# Patient Record
Sex: MALE | Race: WHITE | NOT HISPANIC OR LATINO | URBAN - METROPOLITAN AREA
[De-identification: names, ages, dates, MRNs, and addresses within clinical notes are randomized per-mention and may not be internally consistent; named-entity substitution may affect disease eponyms.]

---

## 2017-05-26 ENCOUNTER — ANESTHESIA (OUTPATIENT)
Dept: GASTROENTEROLOGY | Facility: HOSPITAL | Age: 52
End: 2017-05-26

## 2017-05-26 ENCOUNTER — HOSPITAL ENCOUNTER (OUTPATIENT)
Facility: HOSPITAL | Age: 52
Setting detail: HOSPITAL OUTPATIENT SURGERY
Discharge: HOME | End: 2017-05-26
Attending: INTERNAL MEDICINE | Admitting: INTERNAL MEDICINE

## 2017-05-26 ENCOUNTER — ANESTHESIA EVENT (OUTPATIENT)
Dept: GASTROENTEROLOGY | Facility: HOSPITAL | Age: 52
End: 2017-05-26

## 2017-05-26 ENCOUNTER — ON CAMPUS - OUTPATIENT (OUTPATIENT)
Dept: URBAN - METROPOLITAN AREA HOSPITAL 114 | Facility: HOSPITAL | Age: 52
End: 2017-05-26
Payer: COMMERCIAL

## 2017-05-26 VITALS
HEART RATE: 79 BPM | WEIGHT: 261.19 LBS | DIASTOLIC BLOOD PRESSURE: 89 MMHG | RESPIRATION RATE: 16 BRPM | TEMPERATURE: 98.6 F | OXYGEN SATURATION: 93 % | SYSTOLIC BLOOD PRESSURE: 127 MMHG | BODY MASS INDEX: 36.56 KG/M2 | HEIGHT: 71 IN

## 2017-05-26 DIAGNOSIS — K62.0 ANAL POLYP: ICD-10-CM

## 2017-05-26 DIAGNOSIS — K63.5 POLYP OF COLON: ICD-10-CM

## 2017-05-26 DIAGNOSIS — Z12.11 ENCOUNTER FOR SCREENING COLONOSCOPY: ICD-10-CM

## 2017-05-26 DIAGNOSIS — Z12.11 ENCOUNTER FOR SCREENING FOR MALIGNANT NEOPLASM OF COLON: ICD-10-CM

## 2017-05-26 PROBLEM — Z72.0 TOBACCO USE: Status: ACTIVE | Noted: 2017-04-06

## 2017-05-26 LAB — GLUCOSE BLDC GLUCOMTR-MCNC: 179 MG/DL (ref 70–130)

## 2017-05-26 PROCEDURE — 88305 TISSUE EXAM BY PATHOLOGIST: CPT | Performed by: INTERNAL MEDICINE

## 2017-05-26 PROCEDURE — 25010000002 PROPOFOL 10 MG/ML EMULSION: Performed by: ANESTHESIOLOGY

## 2017-05-26 PROCEDURE — 25010000002 MIDAZOLAM PER 1 MG: Performed by: ANESTHESIOLOGY

## 2017-05-26 PROCEDURE — 45380 COLONOSCOPY AND BIOPSY: CPT | Mod: 33 | Performed by: INTERNAL MEDICINE

## 2017-05-26 PROCEDURE — 82962 GLUCOSE BLOOD TEST: CPT

## 2017-05-26 RX ORDER — PROPOFOL 10 MG/ML
VIAL (ML) INTRAVENOUS CONTINUOUS PRN
Status: DISCONTINUED | OUTPATIENT
Start: 2017-05-26 | End: 2017-05-26 | Stop reason: SURG

## 2017-05-26 RX ORDER — LISINOPRIL AND HYDROCHLOROTHIAZIDE 20; 12.5 MG/1; MG/1
1 TABLET ORAL DAILY
COMMUNITY

## 2017-05-26 RX ORDER — MIDAZOLAM HYDROCHLORIDE 1 MG/ML
INJECTION INTRAMUSCULAR; INTRAVENOUS AS NEEDED
Status: DISCONTINUED | OUTPATIENT
Start: 2017-05-26 | End: 2017-05-26 | Stop reason: SURG

## 2017-05-26 RX ORDER — SODIUM CHLORIDE, SODIUM LACTATE, POTASSIUM CHLORIDE, CALCIUM CHLORIDE 600; 310; 30; 20 MG/100ML; MG/100ML; MG/100ML; MG/100ML
1000 INJECTION, SOLUTION INTRAVENOUS CONTINUOUS PRN
Status: DISCONTINUED | OUTPATIENT
Start: 2017-05-26 | End: 2017-05-26 | Stop reason: HOSPADM

## 2017-05-26 RX ORDER — PROPOFOL 10 MG/ML
VIAL (ML) INTRAVENOUS AS NEEDED
Status: DISCONTINUED | OUTPATIENT
Start: 2017-05-26 | End: 2017-05-26 | Stop reason: SURG

## 2017-05-26 RX ORDER — ASPIRIN 81 MG/1
81 TABLET ORAL DAILY
COMMUNITY

## 2017-05-26 RX ORDER — SIMVASTATIN 40 MG
40 TABLET ORAL NIGHTLY
COMMUNITY

## 2017-05-26 RX ADMIN — PROPOFOL 100 MCG/KG/MIN: 10 INJECTION, EMULSION INTRAVENOUS at 08:50

## 2017-05-26 RX ADMIN — SODIUM CHLORIDE, POTASSIUM CHLORIDE, SODIUM LACTATE AND CALCIUM CHLORIDE 1000 ML: 600; 310; 30; 20 INJECTION, SOLUTION INTRAVENOUS at 08:08

## 2017-05-26 RX ADMIN — MIDAZOLAM HYDROCHLORIDE 1 MG: 1 INJECTION, SOLUTION INTRAMUSCULAR; INTRAVENOUS at 08:47

## 2017-05-26 RX ADMIN — ALFENTANIL HYDROCHLORIDE 250 MCG: 500 INJECTION, SOLUTION INTRAVENOUS at 08:47

## 2017-05-26 RX ADMIN — PROPOFOL 100 MG: 10 INJECTION, EMULSION INTRAVENOUS at 08:50

## 2017-05-30 LAB
CYTO UR: NORMAL
LAB AP CASE REPORT: NORMAL
Lab: NORMAL
PATH REPORT.FINAL DX SPEC: NORMAL
PATH REPORT.GROSS SPEC: NORMAL

## 2017-08-29 ENCOUNTER — TRANSCRIBE ORDERS (OUTPATIENT)
Dept: ADMINISTRATIVE | Facility: HOSPITAL | Age: 52
End: 2017-08-29

## 2017-08-29 DIAGNOSIS — E34.9 INCREASED PTH LEVEL: Primary | ICD-10-CM

## 2017-08-29 DIAGNOSIS — Z79.4 CURRENT USE OF INSULIN (HCC): ICD-10-CM

## 2017-09-06 ENCOUNTER — HOSPITAL ENCOUNTER (OUTPATIENT)
Dept: NUCLEAR MEDICINE | Facility: HOSPITAL | Age: 52
Discharge: HOME | End: 2017-09-06
Attending: SPECIALIST

## 2017-09-06 ENCOUNTER — HOSPITAL ENCOUNTER (OUTPATIENT)
Dept: ULTRASOUND IMAGING | Facility: HOSPITAL | Age: 52
Discharge: HOME | End: 2017-09-06
Attending: SPECIALIST | Admitting: SPECIALIST

## 2017-09-06 DIAGNOSIS — E34.9 INCREASED PTH LEVEL: ICD-10-CM

## 2017-09-06 DIAGNOSIS — Z79.4 CURRENT USE OF INSULIN (HCC): ICD-10-CM

## 2017-09-06 PROCEDURE — 0 TECHNETIUM SESTAMIBI: Performed by: SPECIALIST

## 2017-09-06 PROCEDURE — A9500 TC99M SESTAMIBI: HCPCS | Performed by: SPECIALIST

## 2017-09-06 PROCEDURE — 76536 US EXAM OF HEAD AND NECK: CPT

## 2017-09-06 PROCEDURE — 78071 PARATHYRD PLANAR W/WO SUBTRJ: CPT

## 2017-09-06 RX ADMIN — TECHNETIUM TC-99M SESTAMIBI 1 DOSE: 1 INJECTION INTRAVENOUS at 09:15

## 2017-09-11 ENCOUNTER — APPOINTMENT (OUTPATIENT)
Dept: CT IMAGING | Facility: HOSPITAL | Age: 52
End: 2017-09-11

## 2017-09-11 ENCOUNTER — HOSPITAL ENCOUNTER (EMERGENCY)
Facility: HOSPITAL | Age: 52
Discharge: HOME | End: 2017-09-11
Attending: EMERGENCY MEDICINE | Admitting: EMERGENCY MEDICINE

## 2017-09-11 VITALS
OXYGEN SATURATION: 94 % | DIASTOLIC BLOOD PRESSURE: 80 MMHG | SYSTOLIC BLOOD PRESSURE: 135 MMHG | RESPIRATION RATE: 18 BRPM | BODY MASS INDEX: 37.1 KG/M2 | TEMPERATURE: 98.9 F | HEIGHT: 71 IN | WEIGHT: 265 LBS | HEART RATE: 84 BPM

## 2017-09-11 DIAGNOSIS — N23 RENAL COLIC ON LEFT SIDE: ICD-10-CM

## 2017-09-11 DIAGNOSIS — N20.1 LEFT URETERAL STONE: Primary | ICD-10-CM

## 2017-09-11 LAB
ALBUMIN SERPL-MCNC: 4.9 G/DL (ref 3.5–5.2)
ALBUMIN/GLOB SERPL: 1.6 G/DL
ALP SERPL-CCNC: 70 U/L (ref 39–117)
ALT SERPL W P-5'-P-CCNC: 32 U/L (ref 1–41)
ANION GAP SERPL CALCULATED.3IONS-SCNC: 15.2 MMOL/L
AST SERPL-CCNC: 25 U/L (ref 1–40)
BACTERIA UR QL AUTO: ABNORMAL /HPF
BASOPHILS # BLD AUTO: 0.01 10*3/MM3 (ref 0–0.2)
BASOPHILS NFR BLD AUTO: 0.1 % (ref 0–1.5)
BILIRUB SERPL-MCNC: 0.5 MG/DL (ref 0.1–1.2)
BILIRUB UR QL STRIP: NEGATIVE
BUN BLD-MCNC: 13 MG/DL (ref 6–20)
BUN/CREAT SERPL: 11.6 (ref 7–25)
CALCIUM SPEC-SCNC: 10 MG/DL (ref 8.6–10.5)
CHLORIDE SERPL-SCNC: 98 MMOL/L (ref 98–107)
CLARITY UR: ABNORMAL
CO2 SERPL-SCNC: 25.8 MMOL/L (ref 22–29)
COLOR UR: ABNORMAL
CREAT BLD-MCNC: 1.12 MG/DL (ref 0.76–1.27)
DEPRECATED RDW RBC AUTO: 38.5 FL (ref 37–54)
EOSINOPHIL # BLD AUTO: 0.19 10*3/MM3 (ref 0–0.7)
EOSINOPHIL NFR BLD AUTO: 2 % (ref 0.3–6.2)
ERYTHROCYTE [DISTWIDTH] IN BLOOD BY AUTOMATED COUNT: 12 % (ref 11.5–14.5)
GFR SERPL CREATININE-BSD FRML MDRD: 69 ML/MIN/1.73
GLOBULIN UR ELPH-MCNC: 3.1 GM/DL
GLUCOSE BLD-MCNC: 161 MG/DL (ref 65–99)
GLUCOSE UR STRIP-MCNC: ABNORMAL MG/DL
HCT VFR BLD AUTO: 47.2 % (ref 40.4–52.2)
HGB BLD-MCNC: 16.9 G/DL (ref 13.7–17.6)
HGB UR QL STRIP.AUTO: ABNORMAL
HOLD SPECIMEN: NORMAL
HOLD SPECIMEN: NORMAL
HYALINE CASTS UR QL AUTO: ABNORMAL /LPF
IMM GRANULOCYTES # BLD: 0.04 10*3/MM3 (ref 0–0.03)
IMM GRANULOCYTES NFR BLD: 0.4 % (ref 0–0.5)
KETONES UR QL STRIP: ABNORMAL
LEUKOCYTE ESTERASE UR QL STRIP.AUTO: ABNORMAL
LIPASE SERPL-CCNC: 45 U/L (ref 13–60)
LYMPHOCYTES # BLD AUTO: 2.2 10*3/MM3 (ref 0.9–4.8)
LYMPHOCYTES NFR BLD AUTO: 23.1 % (ref 19.6–45.3)
MCH RBC QN AUTO: 31.9 PG (ref 27–32.7)
MCHC RBC AUTO-ENTMCNC: 35.8 G/DL (ref 32.6–36.4)
MCV RBC AUTO: 89.2 FL (ref 79.8–96.2)
MONOCYTES # BLD AUTO: 0.72 10*3/MM3 (ref 0.2–1.2)
MONOCYTES NFR BLD AUTO: 7.6 % (ref 5–12)
NEUTROPHILS # BLD AUTO: 6.35 10*3/MM3 (ref 1.9–8.1)
NEUTROPHILS NFR BLD AUTO: 66.8 % (ref 42.7–76)
NITRITE UR QL STRIP: NEGATIVE
PH UR STRIP.AUTO: 5.5 [PH] (ref 5–8)
PLATELET # BLD AUTO: 255 10*3/MM3 (ref 140–500)
PMV BLD AUTO: 10.1 FL (ref 6–12)
POTASSIUM BLD-SCNC: 4 MMOL/L (ref 3.5–5.2)
PROT SERPL-MCNC: 8 G/DL (ref 6–8.5)
PROT UR QL STRIP: ABNORMAL
RBC # BLD AUTO: 5.29 10*6/MM3 (ref 4.6–6)
RBC # UR: ABNORMAL /HPF
REF LAB TEST METHOD: ABNORMAL
SODIUM BLD-SCNC: 139 MMOL/L (ref 136–145)
SP GR UR STRIP: 1.02 (ref 1–1.03)
SQUAMOUS #/AREA URNS HPF: ABNORMAL /HPF
UROBILINOGEN UR QL STRIP: ABNORMAL
WBC NRBC COR # BLD: 9.51 10*3/MM3 (ref 4.5–10.7)
WBC UR QL AUTO: ABNORMAL /HPF
WHOLE BLOOD HOLD SPECIMEN: NORMAL
WHOLE BLOOD HOLD SPECIMEN: NORMAL

## 2017-09-11 PROCEDURE — 25010000002 KETOROLAC TROMETHAMINE PER 15 MG: Performed by: EMERGENCY MEDICINE

## 2017-09-11 PROCEDURE — 80053 COMPREHEN METABOLIC PANEL: CPT | Performed by: EMERGENCY MEDICINE

## 2017-09-11 PROCEDURE — 74176 CT ABD & PELVIS W/O CONTRAST: CPT

## 2017-09-11 PROCEDURE — 96361 HYDRATE IV INFUSION ADD-ON: CPT

## 2017-09-11 PROCEDURE — 36415 COLL VENOUS BLD VENIPUNCTURE: CPT | Performed by: EMERGENCY MEDICINE

## 2017-09-11 PROCEDURE — 81001 URINALYSIS AUTO W/SCOPE: CPT | Performed by: EMERGENCY MEDICINE

## 2017-09-11 PROCEDURE — 87086 URINE CULTURE/COLONY COUNT: CPT | Performed by: EMERGENCY MEDICINE

## 2017-09-11 PROCEDURE — 25010000002 HYDROMORPHONE PER 4 MG: Performed by: EMERGENCY MEDICINE

## 2017-09-11 PROCEDURE — 96375 TX/PRO/DX INJ NEW DRUG ADDON: CPT

## 2017-09-11 PROCEDURE — 85025 COMPLETE CBC W/AUTO DIFF WBC: CPT | Performed by: EMERGENCY MEDICINE

## 2017-09-11 PROCEDURE — 99284 EMERGENCY DEPT VISIT MOD MDM: CPT

## 2017-09-11 PROCEDURE — 96374 THER/PROPH/DIAG INJ IV PUSH: CPT

## 2017-09-11 PROCEDURE — 96376 TX/PRO/DX INJ SAME DRUG ADON: CPT

## 2017-09-11 PROCEDURE — 83690 ASSAY OF LIPASE: CPT | Performed by: EMERGENCY MEDICINE

## 2017-09-11 PROCEDURE — 25010000002 ONDANSETRON PER 1 MG: Performed by: EMERGENCY MEDICINE

## 2017-09-11 RX ORDER — KETOROLAC TROMETHAMINE 30 MG/ML
15 INJECTION, SOLUTION INTRAMUSCULAR; INTRAVENOUS ONCE
Status: COMPLETED | OUTPATIENT
Start: 2017-09-11 | End: 2017-09-11

## 2017-09-11 RX ORDER — SODIUM CHLORIDE 0.9 % (FLUSH) 0.9 %
10 SYRINGE (ML) INJECTION AS NEEDED
Status: DISCONTINUED | OUTPATIENT
Start: 2017-09-11 | End: 2017-09-11 | Stop reason: HOSPADM

## 2017-09-11 RX ORDER — ONDANSETRON 2 MG/ML
4 INJECTION INTRAMUSCULAR; INTRAVENOUS ONCE
Status: COMPLETED | OUTPATIENT
Start: 2017-09-11 | End: 2017-09-11

## 2017-09-11 RX ORDER — OXYCODONE AND ACETAMINOPHEN 10; 325 MG/1; MG/1
1 TABLET ORAL EVERY 4 HOURS PRN
Qty: 18 TABLET | Refills: 0 | Status: SHIPPED | OUTPATIENT
Start: 2017-09-11 | End: 2018-05-04

## 2017-09-11 RX ORDER — TAMSULOSIN HYDROCHLORIDE 0.4 MG/1
1 CAPSULE ORAL NIGHTLY
Qty: 10 CAPSULE | Refills: 0 | Status: SHIPPED | OUTPATIENT
Start: 2017-09-11 | End: 2018-05-04

## 2017-09-11 RX ORDER — ONDANSETRON 4 MG/1
4 TABLET, ORALLY DISINTEGRATING ORAL EVERY 6 HOURS PRN
Qty: 15 TABLET | Refills: 0 | Status: SHIPPED | OUTPATIENT
Start: 2017-09-11 | End: 2018-05-04

## 2017-09-11 RX ORDER — SULFAMETHOXAZOLE AND TRIMETHOPRIM 800; 160 MG/1; MG/1
1 TABLET ORAL 2 TIMES DAILY
Qty: 20 TABLET | Refills: 0 | Status: SHIPPED | OUTPATIENT
Start: 2017-09-11 | End: 2018-05-04

## 2017-09-11 RX ADMIN — KETOROLAC TROMETHAMINE 15 MG: 30 INJECTION, SOLUTION INTRAMUSCULAR at 17:48

## 2017-09-11 RX ADMIN — HYDROMORPHONE HYDROCHLORIDE 1 MG: 1 INJECTION, SOLUTION INTRAMUSCULAR; INTRAVENOUS; SUBCUTANEOUS at 18:24

## 2017-09-11 RX ADMIN — KETOROLAC TROMETHAMINE 15 MG: 30 INJECTION, SOLUTION INTRAMUSCULAR at 20:08

## 2017-09-11 RX ADMIN — SODIUM CHLORIDE 1000 ML: 9 INJECTION, SOLUTION INTRAVENOUS at 17:52

## 2017-09-11 RX ADMIN — HYDROMORPHONE HYDROCHLORIDE 1 MG: 1 INJECTION, SOLUTION INTRAMUSCULAR; INTRAVENOUS; SUBCUTANEOUS at 17:48

## 2017-09-11 RX ADMIN — ONDANSETRON 4 MG: 2 INJECTION INTRAMUSCULAR; INTRAVENOUS at 17:47

## 2017-09-11 NOTE — ED PROVIDER NOTES
" EMERGENCY DEPARTMENT ENCOUNTER    CHIEF COMPLAINT  Chief Complaint: abdominal pain  History given by: pt  History limited by: nothing  Room Number: 19/19  PMD: No Known Provider      HPI:  Pt is a 52 y.o. male who presents complaining of episodic abdominal pain for one day. Last episode of pain began one and a half hours ago, and has not resolved. Pt has had 8 past kidney stones, some of which have passed on their own.  His last renal stone was approximately ten years ago and required lithotripsy and ureteral stenting.    Duration:  One day  Onset: gradual  Timing: episodic  Location: abdomen  Radiation: none  Quality: \"pain\"  Intensity/Severity: moderate  Progression: unchanged  Associated Symptoms: none  Aggravating Factors: unknown  Alleviating Factors: unknown  Previous Episodes: Pt has had 8 past kidney stones  Treatment before arrival: unknown    PAST MEDICAL HISTORY  Active Ambulatory Problems     Diagnosis Date Noted   • Controlled type 2 diabetes mellitus without complication 11/06/2012   • Hypertension 11/06/2012   • Hyperlipidemia 11/06/2012   • Obstructive sleep apnea syndrome 08/14/2014   • Tobacco use 04/06/2017     Resolved Ambulatory Problems     Diagnosis Date Noted   • No Resolved Ambulatory Problems     Past Medical History:   Diagnosis Date   • Depression    • Diabetes mellitus    • Hyperlipidemia    • Hypertension    • Kidney stones    • Sleep apnea        PAST SURGICAL HISTORY  Past Surgical History:   Procedure Laterality Date   • BRANCHIAL CLEFT CYST EXCISION     • COLONOSCOPY N/A 5/26/2017    Procedure: COLONOSCOPY INTO TERMINAL ILEUM WITH COLD BIOPSY POLYPECTOMIES;  Surgeon: Kelby Winter MD;  Location: [facility];  Service:    • CYSTOSCOPY     • EYE SURGERY     • TONSILLECTOMY         FAMILY HISTORY  History reviewed. No pertinent family history.    SOCIAL HISTORY  Social History     Social History   • Marital status:      Spouse name: N/A   • Number of children: N/A   • " Years of education: N/A     Occupational History   • Not on file.     Social History Main Topics   • Smoking status: Current Every Day Smoker     Packs/day: 0.25   • Smokeless tobacco: Not on file   • Alcohol use No   • Drug use: No   • Sexual activity: Defer     Other Topics Concern   • Not on file     Social History Narrative       ALLERGIES  Penicillins and Sertraline hcl    REVIEW OF SYSTEMS  Review of Systems   Constitutional: Negative for activity change, appetite change and fever.   HENT: Negative for congestion and sore throat.    Eyes: Negative.    Respiratory: Negative for cough and shortness of breath.    Cardiovascular: Negative for chest pain and leg swelling.   Gastrointestinal: Positive for abdominal pain. Negative for diarrhea and vomiting.   Endocrine: Negative.    Genitourinary: Negative for decreased urine volume and dysuria.   Musculoskeletal: Negative for neck pain.   Skin: Negative for rash and wound.   Allergic/Immunologic: Negative.    Neurological: Negative for weakness, numbness and headaches.   Hematological: Negative.    Psychiatric/Behavioral: Negative.    All other systems reviewed and are negative.      PHYSICAL EXAM  ED Triage Vitals   Temp Heart Rate Resp BP SpO2   09/11/17 1650 09/11/17 1650 09/11/17 1650 09/11/17 1650 09/11/17 1650   98.9 °F (37.2 °C) 87 16 168/100 98 %      Temp src Heart Rate Source Patient Position BP Location FiO2 (%)   09/11/17 1650 -- -- -- --   Oral           Physical Exam   Constitutional: He is oriented to person, place, and time and well-developed, well-nourished, and in no distress. He appears distressed (moderate, pacing in the room).   HENT:   Head: Normocephalic and atraumatic.   Eyes: EOM are normal. Pupils are equal, round, and reactive to light.   Neck: Normal range of motion. Neck supple.   Cardiovascular: Regular rhythm and normal heart sounds.  Tachycardia present.    Pulmonary/Chest: Effort normal and breath sounds normal. No respiratory  distress.   Abdominal: Soft. There is tenderness in the left lower quadrant. There is CVA tenderness (left). There is no rebound and no guarding.   Musculoskeletal: Normal range of motion. He exhibits no edema.   Neurological: He is alert and oriented to person, place, and time. He has normal sensation and normal strength.   Skin: Skin is warm and dry.   Psychiatric: Mood and affect normal.   Nursing note and vitals reviewed.      LAB RESULTS  Lab Results (last 24 hours)     Procedure Component Value Units Date/Time    CBC & Differential [[id number]] Collected:  09/11/17 1657    Specimen:  Blood Updated:  09/11/17 1711    Narrative:       The following orders were created for panel order CBC & Differential.  Procedure                               Abnormality         Status                     ---------                               -----------         ------                     CBC Auto Differential[[id number]]        Abnormal            Final result                 Please view results for these tests on the individual orders.    Comprehensive Metabolic Panel [[id number]]  (Abnormal) Collected:  09/11/17 1657    Specimen:  Blood Updated:  09/11/17 1731     Glucose 161 (H) mg/dL      BUN 13 mg/dL      Creatinine 1.12 mg/dL      Sodium 139 mmol/L      Potassium 4.0 mmol/L      Chloride 98 mmol/L      CO2 25.8 mmol/L      Calcium 10.0 mg/dL      Total Protein 8.0 g/dL      Albumin 4.90 g/dL      ALT (SGPT) 32 U/L      AST (SGOT) 25 U/L      Alkaline Phosphatase 70 U/L      Total Bilirubin 0.5 mg/dL      eGFR Non African Amer 69 mL/min/1.73      Globulin 3.1 gm/dL      A/G Ratio 1.6 g/dL      BUN/Creatinine Ratio 11.6     Anion Gap 15.2 mmol/L     Lipase [[id number]]  (Normal) Collected:  09/11/17 1657    Specimen:  Blood Updated:  09/11/17 1731     Lipase 45 U/L     CBC Auto Differential [[id number]]  (Abnormal) Collected:  09/11/17 1657    Specimen:  Blood Updated:  09/11/17 1711     WBC 9.51 10*3/mm3      RBC 5.29  10*6/mm3      Hemoglobin 16.9 g/dL      Hematocrit 47.2 %      MCV 89.2 fL      MCH 31.9 pg      MCHC 35.8 g/dL      RDW 12.0 %      RDW-SD 38.5 fl      MPV 10.1 fL      Platelets 255 10*3/mm3      Neutrophil % 66.8 %      Lymphocyte % 23.1 %      Monocyte % 7.6 %      Eosinophil % 2.0 %      Basophil % 0.1 %      Immature Grans % 0.4 %      Neutrophils, Absolute 6.35 10*3/mm3      Lymphocytes, Absolute 2.20 10*3/mm3      Monocytes, Absolute 0.72 10*3/mm3      Eosinophils, Absolute 0.19 10*3/mm3      Basophils, Absolute 0.01 10*3/mm3      Immature Grans, Absolute 0.04 (H) 10*3/mm3     Urinalysis With / Culture If Indicated [[id number]]  (Abnormal) Collected:  09/11/17 1714    Specimen:  Urine from Urine, Clean Catch Updated:  09/11/17 1746     Color, UA Anette (A)     Appearance, UA Cloudy (A)     pH, UA 5.5     Specific Gravity, UA 1.024     Glucose,  mg/dL (Trace) (A)     Ketones, UA Trace (A)     Bilirubin, UA Negative     Blood, UA Large (3+) (A)     Protein,  mg/dL (2+) (A)     Leuk Esterase, UA Small (1+) (A)     Nitrite, UA Negative     Urobilinogen, UA 1.0 E.U./dL    Urinalysis, Microscopic Only [[id number]]  (Abnormal) Collected:  09/11/17 1714    Specimen:  Urine from Urine, Clean Catch Updated:  09/11/17 1757     RBC, UA Too Numerous to Count (A) /HPF      WBC, UA 6-12 (A) /HPF      Bacteria, UA 3+ (A) /HPF      Squamous Epithelial Cells, UA None Seen /HPF      Hyaline Casts, UA None Seen /LPF      Methodology Manual Light Microscopy    Urine Culture [[id number]] Collected:  09/11/17 1714    Specimen:  Urine from Urine, Clean Catch Updated:  09/11/17 1757          I ordered the above labs and reviewed the results    RADIOLOGY  CT Abdomen Pelvis Without Contrast   Final Result   1. 5 mm distal left ureteral calcification with mild hydronephrosis and   hydroureter.       This report was finalized on 9/11/2017 7:02 PM by Dr. Spenser Woodson MD.               I ordered the above noted radiological  studies. Interpreted by radiologist. Reviewed by me in PACS.       PROCEDURES  Procedures      PROGRESS AND CONSULTS  ED Course     1652 - Ordered labs for further evaluation.     1735 - Ordered CT Abd/Pelvis for further evaluation. Ordered IVF for hydration. Ordered Toradol, Dilaudid, and Zofran for pain management.    1821 - Ordered additional Dilaudid for pain management.    1943 - Pt rechecked. Pt's pain is improved. Notified pt of imaging which did show a stone in the left ureter. Discussed plan to discharge the pt on pain and nausea medication, Flomax, and an antibiotic. Pt should follow up with a urologist. Pt understands and agrees with plan, all questions addressed.    MEDICAL DECISION MAKING  Results were reviewed/discussed with the patient and they were also made aware of online access. Pt also made aware that some labs, such as cultures, will not be resulted during ER visit and follow up with PMD is necessary.     MDM  Number of Diagnoses or Management Options     Amount and/or Complexity of Data Reviewed  Clinical lab tests: reviewed and ordered (RBC, UA - too numerous. WBC, UA - 6-12. Bacteria, UA - 3+)  Tests in the radiology section of CPT®: reviewed and ordered (CT Abd/Pelvis - 1. 5 mm distal left ureteral calcification with mild hydronephrosis and hydroureter.)           DIAGNOSIS  Final diagnoses:   Left ureteral stone   Renal colic on left side       DISPOSITION  DISCHARGE    Patient discharged in stable condition.    Reviewed implications of results, diagnosis, meds, responsibility to follow up, warning signs and symptoms of possible worsening, potential complications and reasons to return to ER.    Patient/Family voiced understanding of above instructions.    Discussed plan for discharge, as there is no emergent indication for admission.  Pt/family is agreeable and understands need for follow up and repeat testing.  Pt is aware that discharge does not mean that nothing is wrong but it indicates  no emergency is present that requires admission and they must continue care with follow-up as given below or physician of their choice.     FOLLOW-UP  Grady Brock Jr., MD  [address] [id number]  [phone]    Schedule an appointment as soon as possible for a visit           Medication List      New Prescriptions          ondansetron ODT 4 MG disintegrating tablet   Commonly known as:  ZOFRAN-ODT   Take 1 tablet by mouth Every 6 (Six) Hours As Needed for Nausea or   Vomiting.       oxyCODONE-acetaminophen  MG per tablet   Commonly known as:  PERCOCET   Take 1 tablet by mouth Every 4 (Four) Hours As Needed for Moderate Pain .       sulfamethoxazole-trimethoprim 800-160 MG per tablet   Commonly known as:  BACTRIM DS,SEPTRA DS   Take 1 tablet by mouth 2 (Two) Times a Day.       tamsulosin 0.4 MG capsule 24 hr capsule   Commonly known as:  FLOMAX   Take 1 capsule by mouth Every Night.               Latest Documented Vital Signs:  As of 11:03 PM  BP- 135/80 HR- 84 Temp- 98.9 °F (37.2 °C) (Oral) O2 sat- 94%    --  Documentation assistance provided by joyce So for Dr. Lyman.  Information recorded by the scribe was done at my direction and has been verified and validated by me.     Greg So  09/11/17 1954       Elton Lyman MD  09/11/17 4997

## 2017-09-13 LAB — BACTERIA SPEC AEROBE CULT: NORMAL

## 2017-09-15 ENCOUNTER — ANESTHESIA EVENT (OUTPATIENT)
Dept: PERIOP | Facility: HOSPITAL | Age: 52
End: 2017-09-15

## 2017-09-15 ENCOUNTER — ANESTHESIA (OUTPATIENT)
Dept: PERIOP | Facility: HOSPITAL | Age: 52
End: 2017-09-15

## 2017-09-15 ENCOUNTER — HOSPITAL ENCOUNTER (OUTPATIENT)
Facility: HOSPITAL | Age: 52
Setting detail: HOSPITAL OUTPATIENT SURGERY
Discharge: HOME | End: 2017-09-15
Attending: UROLOGY | Admitting: UROLOGY

## 2017-09-15 VITALS
SYSTOLIC BLOOD PRESSURE: 136 MMHG | BODY MASS INDEX: 37.32 KG/M2 | OXYGEN SATURATION: 92 % | TEMPERATURE: 97.9 F | WEIGHT: 267.6 LBS | HEART RATE: 84 BPM | DIASTOLIC BLOOD PRESSURE: 82 MMHG | RESPIRATION RATE: 16 BRPM

## 2017-09-15 DIAGNOSIS — N20.1 URETERAL CALCULUS, LEFT: Primary | ICD-10-CM

## 2017-09-15 LAB — GLUCOSE BLDC GLUCOMTR-MCNC: 239 MG/DL (ref 70–130)

## 2017-09-15 PROCEDURE — 25010000002 FENTANYL CITRATE (PF) 100 MCG/2ML SOLUTION: Performed by: ANESTHESIOLOGY

## 2017-09-15 PROCEDURE — 25010000002 FENTANYL CITRATE (PF) 100 MCG/2ML SOLUTION: Performed by: NURSE ANESTHETIST, CERTIFIED REGISTERED

## 2017-09-15 PROCEDURE — 25010000002 LEVOFLOXACIN PER 250 MG: Performed by: UROLOGY

## 2017-09-15 PROCEDURE — C2617 STENT, NON-COR, TEM W/O DEL: HCPCS | Performed by: UROLOGY

## 2017-09-15 PROCEDURE — 25010000002 ONDANSETRON PER 1 MG: Performed by: ANESTHESIOLOGY

## 2017-09-15 PROCEDURE — 25010000002 MIDAZOLAM PER 1 MG: Performed by: ANESTHESIOLOGY

## 2017-09-15 PROCEDURE — 25010000002 ONDANSETRON PER 1 MG: Performed by: NURSE ANESTHETIST, CERTIFIED REGISTERED

## 2017-09-15 PROCEDURE — C1758 CATHETER, URETERAL: HCPCS | Performed by: UROLOGY

## 2017-09-15 PROCEDURE — 82962 GLUCOSE BLOOD TEST: CPT

## 2017-09-15 PROCEDURE — 25010000002 PROPOFOL 10 MG/ML EMULSION: Performed by: NURSE ANESTHETIST, CERTIFIED REGISTERED

## 2017-09-15 PROCEDURE — 63710000001 PROMETHAZINE PER 25 MG

## 2017-09-15 PROCEDURE — 63710000001 PROMETHAZINE PER 25 MG: Performed by: ANESTHESIOLOGY

## 2017-09-15 DEVICE — URETERAL STENT
Type: IMPLANTABLE DEVICE | Site: URETER | Status: FUNCTIONAL
Brand: CONTOUR™

## 2017-09-15 RX ORDER — SODIUM CHLORIDE, SODIUM LACTATE, POTASSIUM CHLORIDE, CALCIUM CHLORIDE 600; 310; 30; 20 MG/100ML; MG/100ML; MG/100ML; MG/100ML
9 INJECTION, SOLUTION INTRAVENOUS CONTINUOUS
Status: DISCONTINUED | OUTPATIENT
Start: 2017-09-15 | End: 2017-09-18 | Stop reason: HOSPADM

## 2017-09-15 RX ORDER — FAMOTIDINE 10 MG/ML
20 INJECTION, SOLUTION INTRAVENOUS ONCE
Status: COMPLETED | OUTPATIENT
Start: 2017-09-15 | End: 2017-09-15

## 2017-09-15 RX ORDER — PROMETHAZINE HYDROCHLORIDE 25 MG/1
TABLET ORAL
Status: COMPLETED
Start: 2017-09-15 | End: 2017-09-15

## 2017-09-15 RX ORDER — CIPROFLOXACIN 500 MG/1
500 TABLET, FILM COATED ORAL 2 TIMES DAILY
Qty: 10 TABLET | Refills: 0 | Status: SHIPPED | OUTPATIENT
Start: 2017-09-15 | End: 2017-09-20

## 2017-09-15 RX ORDER — PROPOFOL 10 MG/ML
VIAL (ML) INTRAVENOUS AS NEEDED
Status: DISCONTINUED | OUTPATIENT
Start: 2017-09-15 | End: 2017-09-15 | Stop reason: SURG

## 2017-09-15 RX ORDER — LIDOCAINE HYDROCHLORIDE 10 MG/ML
0.5 INJECTION, SOLUTION EPIDURAL; INFILTRATION; INTRACAUDAL; PERINEURAL ONCE AS NEEDED
Status: COMPLETED | OUTPATIENT
Start: 2017-09-15 | End: 2017-09-15

## 2017-09-15 RX ORDER — FLUMAZENIL 0.1 MG/ML
0.2 INJECTION INTRAVENOUS AS NEEDED
Status: DISCONTINUED | OUTPATIENT
Start: 2017-09-15 | End: 2017-09-18 | Stop reason: HOSPADM

## 2017-09-15 RX ORDER — MIDAZOLAM HYDROCHLORIDE 1 MG/ML
2 INJECTION INTRAMUSCULAR; INTRAVENOUS
Status: DISCONTINUED | OUTPATIENT
Start: 2017-09-15 | End: 2017-09-15 | Stop reason: HOSPADM

## 2017-09-15 RX ORDER — ONDANSETRON 2 MG/ML
4 INJECTION INTRAMUSCULAR; INTRAVENOUS ONCE AS NEEDED
Status: COMPLETED | OUTPATIENT
Start: 2017-09-15 | End: 2017-09-15

## 2017-09-15 RX ORDER — ONDANSETRON 2 MG/ML
INJECTION INTRAMUSCULAR; INTRAVENOUS AS NEEDED
Status: DISCONTINUED | OUTPATIENT
Start: 2017-09-15 | End: 2017-09-15 | Stop reason: SURG

## 2017-09-15 RX ORDER — MAGNESIUM HYDROXIDE 1200 MG/15ML
LIQUID ORAL AS NEEDED
Status: DISCONTINUED | OUTPATIENT
Start: 2017-09-15 | End: 2017-09-15 | Stop reason: HOSPADM

## 2017-09-15 RX ORDER — FENTANYL CITRATE 50 UG/ML
50 INJECTION, SOLUTION INTRAMUSCULAR; INTRAVENOUS
Status: DISCONTINUED | OUTPATIENT
Start: 2017-09-15 | End: 2017-09-18 | Stop reason: HOSPADM

## 2017-09-15 RX ORDER — HYDRALAZINE HYDROCHLORIDE 20 MG/ML
5 INJECTION INTRAMUSCULAR; INTRAVENOUS
Status: DISCONTINUED | OUTPATIENT
Start: 2017-09-15 | End: 2017-09-18 | Stop reason: HOSPADM

## 2017-09-15 RX ORDER — FENTANYL CITRATE 50 UG/ML
INJECTION, SOLUTION INTRAMUSCULAR; INTRAVENOUS AS NEEDED
Status: DISCONTINUED | OUTPATIENT
Start: 2017-09-15 | End: 2017-09-15 | Stop reason: SURG

## 2017-09-15 RX ORDER — DEXTROSE, SODIUM CHLORIDE, SODIUM LACTATE, POTASSIUM CHLORIDE, AND CALCIUM CHLORIDE 5; .6; .31; .03; .02 G/100ML; G/100ML; G/100ML; G/100ML; G/100ML
125 INJECTION, SOLUTION INTRAVENOUS ONCE
Status: COMPLETED | OUTPATIENT
Start: 2017-09-15 | End: 2017-09-15

## 2017-09-15 RX ORDER — LEVOFLOXACIN 5 MG/ML
750 INJECTION, SOLUTION INTRAVENOUS EVERY 24 HOURS
Status: DISCONTINUED | OUTPATIENT
Start: 2017-09-15 | End: 2017-09-18 | Stop reason: HOSPADM

## 2017-09-15 RX ORDER — LABETALOL HYDROCHLORIDE 5 MG/ML
5 INJECTION, SOLUTION INTRAVENOUS
Status: DISCONTINUED | OUTPATIENT
Start: 2017-09-15 | End: 2017-09-18 | Stop reason: HOSPADM

## 2017-09-15 RX ORDER — PROMETHAZINE HYDROCHLORIDE 25 MG/1
25 TABLET ORAL ONCE
Status: COMPLETED | OUTPATIENT
Start: 2017-09-15 | End: 2017-09-15

## 2017-09-15 RX ORDER — EPHEDRINE SULFATE 50 MG/ML
5 INJECTION, SOLUTION INTRAVENOUS ONCE AS NEEDED
Status: DISCONTINUED | OUTPATIENT
Start: 2017-09-15 | End: 2017-09-18 | Stop reason: HOSPADM

## 2017-09-15 RX ORDER — OXYCODONE HYDROCHLORIDE AND ACETAMINOPHEN 5; 325 MG/1; MG/1
2 TABLET ORAL ONCE AS NEEDED
Status: COMPLETED | OUTPATIENT
Start: 2017-09-15 | End: 2017-09-15

## 2017-09-15 RX ORDER — HYDROMORPHONE HYDROCHLORIDE 1 MG/ML
0.5 INJECTION, SOLUTION INTRAMUSCULAR; INTRAVENOUS; SUBCUTANEOUS
Status: DISCONTINUED | OUTPATIENT
Start: 2017-09-15 | End: 2017-09-18 | Stop reason: HOSPADM

## 2017-09-15 RX ORDER — MIDAZOLAM HYDROCHLORIDE 1 MG/ML
1 INJECTION INTRAMUSCULAR; INTRAVENOUS
Status: DISCONTINUED | OUTPATIENT
Start: 2017-09-15 | End: 2017-09-15 | Stop reason: HOSPADM

## 2017-09-15 RX ORDER — DIPHENHYDRAMINE HYDROCHLORIDE 50 MG/ML
6.25 INJECTION INTRAMUSCULAR; INTRAVENOUS
Status: DISCONTINUED | OUTPATIENT
Start: 2017-09-15 | End: 2017-09-18 | Stop reason: HOSPADM

## 2017-09-15 RX ORDER — FENTANYL CITRATE 50 UG/ML
100 INJECTION, SOLUTION INTRAMUSCULAR; INTRAVENOUS
Status: DISCONTINUED | OUTPATIENT
Start: 2017-09-15 | End: 2017-09-15 | Stop reason: HOSPADM

## 2017-09-15 RX ORDER — HYDROCODONE BITARTRATE AND ACETAMINOPHEN 7.5; 325 MG/1; MG/1
1-2 TABLET ORAL EVERY 4 HOURS PRN
Qty: 30 TABLET | Refills: 0 | Status: SHIPPED | OUTPATIENT
Start: 2017-09-15 | End: 2018-05-04

## 2017-09-15 RX ORDER — HYDROCODONE BITARTRATE AND ACETAMINOPHEN 7.5; 325 MG/1; MG/1
1 TABLET ORAL ONCE AS NEEDED
Status: COMPLETED | OUTPATIENT
Start: 2017-09-15 | End: 2017-09-15

## 2017-09-15 RX ORDER — SODIUM CHLORIDE 0.9 % (FLUSH) 0.9 %
1-10 SYRINGE (ML) INJECTION AS NEEDED
Status: DISCONTINUED | OUTPATIENT
Start: 2017-09-15 | End: 2017-09-15 | Stop reason: HOSPADM

## 2017-09-15 RX ORDER — LIDOCAINE HYDROCHLORIDE 20 MG/ML
INJECTION, SOLUTION INFILTRATION; PERINEURAL AS NEEDED
Status: DISCONTINUED | OUTPATIENT
Start: 2017-09-15 | End: 2017-09-15 | Stop reason: SURG

## 2017-09-15 RX ADMIN — LIDOCAINE HYDROCHLORIDE 100 MG: 20 INJECTION, SOLUTION INFILTRATION; PERINEURAL at 08:32

## 2017-09-15 RX ADMIN — ONDANSETRON 4 MG: 2 INJECTION INTRAMUSCULAR; INTRAVENOUS at 09:39

## 2017-09-15 RX ADMIN — SODIUM CHLORIDE, SODIUM LACTATE, POTASSIUM CHLORIDE, CALCIUM CHLORIDE AND DEXTROSE MONOHYDRATE 125 ML/HR: 5; 600; 310; 30; 20 INJECTION, SOLUTION INTRAVENOUS at 15:10

## 2017-09-15 RX ADMIN — PROPOFOL 200 MG: 10 INJECTION, EMULSION INTRAVENOUS at 08:32

## 2017-09-15 RX ADMIN — LIDOCAINE HYDROCHLORIDE 0.5 ML: 10 INJECTION, SOLUTION EPIDURAL; INFILTRATION; INTRACAUDAL; PERINEURAL at 07:52

## 2017-09-15 RX ADMIN — PROMETHAZINE HYDROCHLORIDE 25 MG: 25 TABLET ORAL at 10:54

## 2017-09-15 RX ADMIN — HYDROCODONE BITARTRATE AND ACETAMINOPHEN 1 TABLET: 7.5; 325 TABLET ORAL at 10:07

## 2017-09-15 RX ADMIN — FENTANYL CITRATE 100 MCG: 50 INJECTION INTRAMUSCULAR; INTRAVENOUS at 08:06

## 2017-09-15 RX ADMIN — SODIUM CHLORIDE, POTASSIUM CHLORIDE, SODIUM LACTATE AND CALCIUM CHLORIDE: 600; 310; 30; 20 INJECTION, SOLUTION INTRAVENOUS at 09:10

## 2017-09-15 RX ADMIN — FAMOTIDINE 20 MG: 10 INJECTION, SOLUTION INTRAVENOUS at 08:08

## 2017-09-15 RX ADMIN — FENTANYL CITRATE 50 MCG: 50 INJECTION INTRAMUSCULAR; INTRAVENOUS at 10:19

## 2017-09-15 RX ADMIN — FENTANYL CITRATE 50 MCG: 50 INJECTION INTRAMUSCULAR; INTRAVENOUS at 08:32

## 2017-09-15 RX ADMIN — FENTANYL CITRATE 50 MCG: 50 INJECTION INTRAMUSCULAR; INTRAVENOUS at 09:47

## 2017-09-15 RX ADMIN — ONDANSETRON 4 MG: 2 INJECTION INTRAMUSCULAR; INTRAVENOUS at 12:34

## 2017-09-15 RX ADMIN — MIDAZOLAM 2 MG: 1 INJECTION INTRAMUSCULAR; INTRAVENOUS at 08:08

## 2017-09-15 RX ADMIN — OXYCODONE HYDROCHLORIDE AND ACETAMINOPHEN 2 TABLET: 5; 325 TABLET ORAL at 13:04

## 2017-09-15 RX ADMIN — LEVOFLOXACIN 750 MG: 5 INJECTION, SOLUTION INTRAVENOUS at 08:30

## 2017-09-15 RX ADMIN — FENTANYL CITRATE 50 MCG: 50 INJECTION INTRAMUSCULAR; INTRAVENOUS at 10:07

## 2017-09-15 RX ADMIN — FENTANYL CITRATE 50 MCG: 50 INJECTION INTRAMUSCULAR; INTRAVENOUS at 09:00

## 2017-09-15 RX ADMIN — LEVOFLOXACIN 750 MG: 5 INJECTION, SOLUTION INTRAVENOUS at 08:11

## 2017-09-15 RX ADMIN — FENTANYL CITRATE 50 MCG: 50 INJECTION INTRAMUSCULAR; INTRAVENOUS at 12:36

## 2017-09-15 RX ADMIN — SODIUM CHLORIDE, POTASSIUM CHLORIDE, SODIUM LACTATE AND CALCIUM CHLORIDE 9 ML/HR: 600; 310; 30; 20 INJECTION, SOLUTION INTRAVENOUS at 07:52

## 2017-09-15 NOTE — PLAN OF CARE
Problem: Perioperative Period (Adult)  Goal: Signs and Symptoms of Listed Potential Problems Will be Absent or Manageable (Perioperative Period)  Outcome: Ongoing (interventions implemented as appropriate)    09/15/17 1020   Perioperative Period   Problems Assessed (Perioperative Period) pain;wound complications;embolism;hemorrhage;hypothermia;hypoxia/hypoxemia   Problems Present (Perioperative Period) pain

## 2017-09-15 NOTE — ANESTHESIA PREPROCEDURE EVALUATION
Anesthesia Evaluation     Patient summary reviewed and Nursing notes reviewed   NPO Solid Status: > 8 hours       Airway   Mallampati: II  TM distance: <3 FB  Neck ROM: full  no difficulty expected  Dental - normal exam     Pulmonary - normal exam   (+) sleep apnea,   Cardiovascular - normal exam    (+) hypertension,       Neuro/Psych- negative ROS  GI/Hepatic/Renal/Endo    (+)  diabetes mellitus,     Musculoskeletal (-) negative ROS    Abdominal  - normal exam   Substance History - negative use     OB/GYN negative ob/gyn ROS         Other                                        Anesthesia Plan    ASA 3     general     intravenous induction   Anesthetic plan and risks discussed with patient.    Plan discussed with CRNA.

## 2017-09-15 NOTE — ANESTHESIA POSTPROCEDURE EVALUATION
Patient: Angelito Hart    Procedure Summary     Date Anesthesia Start Anesthesia Stop Room / Location    09/15/17 0830 0952  [facility]  /  [facility]       Procedure Diagnosis Surgeon Provider    EXTRACORPOREAL SHOCKWAVE LITHOTRIPSY WITH CYSTOSCOPY AND STENT PLACEMENT AND LEFT UTEROSCOPY (Left ) No diagnosis on file. MD Justice Lerner MD          Anesthesia Type: general  Last vitals  BP   146/89 (09/15/17 1039)    Temp   36.2 °C (97.2 °F) (09/15/17 0951)    Pulse   86 (09/15/17 1039)   Resp   16 (09/15/17 1039)    SpO2   93 % (09/15/17 1039)      Post Anesthesia Care and Evaluation    Patient location during evaluation: bedside  Patient participation: complete - patient participated  Level of consciousness: awake  Pain score: 1  Pain management: adequate  Airway patency: patent  Anesthetic complications: No anesthetic complications    Cardiovascular status: acceptable  Respiratory status: acceptable  Hydration status: acceptable    Comments: --------------------            09/15/17               1039     --------------------   BP:       146/89     Pulse:      86       Resp:       16       Temp:                SpO2:      93%      --------------------

## 2017-09-15 NOTE — H&P
FIRST UROLOGY CONSULT      Patient Identification:  NAME:  Angelito Hart  Age:  52 y.o.   Sex:  male   :  1965   MRN:  [id number]       Chief complaint: Left flank pain History of present illness:  Age and presented to the emergency room at [facility] with left flank pain.  He was found to have a 5 mm distal left ureteral calculus.  Also noted was a UPJ stone approximately 5 mm.  He is scheduled for surgical treatment.  He has a past history of stones but has not had one for 11 years      Past medical history:  Past Medical History:   Diagnosis Date   • Depression    • Diabetes mellitus    • Hyperlipidemia    • Hypertension    • Kidney stones    • Sleep apnea     uses cpap       Past surgical history:  Past Surgical History:   Procedure Laterality Date   • BRANCHIAL CLEFT CYST EXCISION     • COLONOSCOPY N/A 2017    Procedure: COLONOSCOPY INTO TERMINAL ILEUM WITH COLD BIOPSY POLYPECTOMIES;  Surgeon: Kelby Winter MD;  Location: [facility] ENDOSCOPY;  Service:    • CYSTOSCOPY         • EYE SURGERY     • TONSILLECTOMY         Allergies:  Penicillins and Sertraline hcl    Home medications:  Prescriptions Prior to Admission   Medication Sig Dispense Refill Last Dose   • aspirin 81 MG EC tablet Take 81 mg by mouth Daily. holding   2017   • lisinopril-hydrochlorothiazide (PRINZIDE,ZESTORETIC) 20-12.5 MG per tablet Take 1 tablet by mouth Daily.   2017 at Unknown time   • metFORMIN (GLUCOPHAGE) 1000 MG tablet Take 1,000 mg by mouth 2 (Two) Times a Day With Meals.   2017 at Unknown time   • ondansetron ODT (ZOFRAN-ODT) 4 MG disintegrating tablet Take 1 tablet by mouth Every 6 (Six) Hours As Needed for Nausea or Vomiting. 15 tablet 0    • oxyCODONE-acetaminophen (PERCOCET)  MG per tablet Take 1 tablet by mouth Every 4 (Four) Hours As Needed for Moderate Pain . 18 tablet 0    • simvastatin (ZOCOR) 40 MG tablet Take 40 mg by mouth Every Night.   2017 at Unknown time   •  SITagliptin (JANUVIA) 100 MG tablet Take 100 mg by mouth Daily.   5/25/2017 at Unknown time   • sulfamethoxazole-trimethoprim (BACTRIM DS,SEPTRA DS) 800-160 MG per tablet Take 1 tablet by mouth 2 (Two) Times a Day. 20 tablet 0    • tamsulosin (FLOMAX) 0.4 MG capsule 24 hr capsule Take 1 capsule by mouth Every Night. 10 capsule 0         Hospital medications:    famotidine 20 mg Intravenous Once   levoFLOXacin 750 mg Intravenous Q24H       lactated ringers 9 mL/hr     fentanyl  •  lidocaine PF 1%  •  midazolam **OR** midazolam  •  sodium chloride    Family history:  Family History   Problem Relation Age of Onset   • Malig Hyperthermia Neg Hx        Social history:  Social History   Substance Use Topics   • Smoking status: Current Every Day Smoker     Packs/day: 0.25   • Smokeless tobacco: None   • Alcohol use No       Review of systems:    Negative 12-system ROS except for the following:        Objective:  TMax 24 hours:   No data recorded.      Vitals Ranges:        Intake/Output Last 3 shifts:        Physical Exam:       General Appearance:    Alert, cooperative, in no acute distress   Head:    Normocephalic, without obvious abnormality, atraumatic   Eyes:          PERRL, conjunctivae and corneas clear   Ears:    Normal external inspection   Throat:   No oral lesions, oral mucosa moist   Neck:   Supple, no LAD, trachea midline   Back:     No CVA tenderness   Lungs:     Respirations unlabored, symmetric excursion    Heart:    RRR, intact peripheral pulses   Abdomen:     Soft, NDNT, no masses, no guarding   :    Testes descended bilaterally, no nodules.  Penis normal.  No scrotal or penile rashes noted   Extremities:   No edema, no deformity   Skin:   No bleeding, bruising or rashes   Neuro/Psych:   Orientation intact, mood/affect pleasant, no focal findings       Results review:   I reviewed the patient's new clinical results.    Data review:  Lab Results (last 24 hours)     Procedure Component Value Units  Date/Time    POC Glucose Fingerstick [[id number]]  (Abnormal) Collected:  09/15/17 0744    Specimen:  Blood Updated:  09/15/17 0746     Glucose 239 (H) mg/dL     Narrative:       Meter: [id number] : [id number] [place]           Imaging:  Imaging Results (last 24 hours)     ** No results found for the last 24 hours. **             Assessment:     Active Problems:    * No active hospital problems. *    Left ureteral left renal calculi    Plan:     Left ESWL  09/15/17  7:52 AM

## 2017-09-15 NOTE — OP NOTE
Operative Report     [facility]    Patient: Angelito Hart  Age:      52 y.o.  :     1965  Sex:      male    Medical Record:  [id number]    Date of Operation/Procedure:  9/15/2017    Pre-operative Diagnosis Code: * No Diagnosis Codes entered *    Pre-operative Diagnosis Free Text:  * No pre-op diagnosis entered *     Post-operative Diagnosis: Left ureteral calculus    Surgeon(s) and Role:     * Otilio Mcgrath MD - Primary     Name of Operation/Procedure:  Procedure(s) and Anesthesia Type:     * EXTRACORPOREAL SHOCKWAVE LITHOTRIPSY WITH CYSTOSCOPY AND STENT PLACEMENT AND LEFT UTEROSCOPY - General    Findings/Complications:  Small stone in the distal left ureter adherent to the ureteral wall multiple  small stones in the left kidney    Description of procedure: After successful induction of anesthesia the patient was placed in the litho-suite preoperatively we were told he had a distal left ureteral stone about 5 mm and another stone in the left kidney near the UPJ.  Unable to identify an obvious stone in either the kidney or the distal ureter and then prepped and draped gestation in the sterile fashion and performed a flexible cystoscope the bladder was negative as was the anterior urethra and the prostate guidewire and an open-ended catheter were passed through the intramural ureter without any type of issue in the passage or any obvious stone seen on fluoroscopic control.  We removed the cystoscope with the guidewire in place and over the guidewire a flexible ureteroscope was passed.  At the level of the kidney cell a lot of small fragments of stone material and debris in the kidney was obvious large enough to treat we then followed the ureteroscope down the entire ureter and right above the intramural ureter stone that was adherent to the roof of the ureter it was not free floating it was not the manipulated off the wall of the ureter but with the ureteroscope in place and the patient and in the  cross hairs of the machine had the stone management and then he received a total of 3000 shocks maximum power setting at 26 KUB and then had a double-J stent placed 26 cm ×6 [place] with an intact string plan is to remove the string in 3 days hopefully will get some stone material to identify because this certainly could be a uric acid stone since his does not show up on  fluoroscopic image    Specimens: No specimen    Estimated Blood Loss: None    Fluids/Drains:26 cm ×6 [place] double-J stent    Otilio Mcgrath MD  9/15/2017  9:17 AM

## 2017-09-15 NOTE — ANESTHESIA PROCEDURE NOTES
Airway  Urgency: elective    Airway not difficult    General Information and Staff    Patient location during procedure: OR  Anesthesiologist: RENDER, KALPANA RAY  CRNA: CHANTELL ROBERT    Indications and Patient Condition  Indications for airway management: airway protection    Preoxygenated: yes  MILS not maintained throughout  Mask difficulty assessment: 1 - vent by mask    Final Airway Details  Final airway type: supraglottic airway      Successful airway: classic  Size 5    Number of attempts at approach: 1    Additional Comments  Lma insertion appears atraumatic. Dentition intact.

## 2017-09-15 NOTE — PLAN OF CARE
Problem: Patient Care Overview (Adult)  Goal: Plan of Care Review  Outcome: Ongoing (interventions implemented as appropriate)    09/15/17 0714   Coping/Psychosocial Response Interventions   Plan Of Care Reviewed With patient   Patient Care Overview   Progress no change       Goal: Discharge Needs Assessment  Outcome: Ongoing (interventions implemented as appropriate)    09/15/17 0714   Discharge Needs Assessment   Concerns To Be Addressed denies needs/concerns at this time   Equipment Needed After Discharge none   Self-Care   Equipment Currently Used at Home none         Problem: Perioperative Period (Adult)  Goal: Signs and Symptoms of Listed Potential Problems Will be Absent or Manageable (Perioperative Period)  Outcome: Ongoing (interventions implemented as appropriate)    09/15/17 0714   Perioperative Period   Problems Assessed (Perioperative Period) all   Problems Present (Perioperative Period) pain

## 2018-05-18 ENCOUNTER — OFFICE VISIT (OUTPATIENT)
Dept: CARDIOLOGY | Facility: CLINIC | Age: 53
End: 2018-05-18

## 2018-05-18 VITALS
SYSTOLIC BLOOD PRESSURE: 138 MMHG | HEIGHT: 71 IN | WEIGHT: 263 LBS | DIASTOLIC BLOOD PRESSURE: 88 MMHG | HEART RATE: 82 BPM | BODY MASS INDEX: 36.82 KG/M2

## 2018-05-18 DIAGNOSIS — R07.2 PRECORDIAL PAIN: Primary | ICD-10-CM

## 2018-05-18 DIAGNOSIS — I10 ESSENTIAL HYPERTENSION: Chronic | ICD-10-CM

## 2018-05-18 DIAGNOSIS — E78.2 MIXED HYPERLIPIDEMIA: Chronic | ICD-10-CM

## 2018-05-18 DIAGNOSIS — G47.33 OBSTRUCTIVE SLEEP APNEA SYNDROME: ICD-10-CM

## 2018-05-18 DIAGNOSIS — Z72.0 TOBACCO USE: Chronic | ICD-10-CM

## 2018-05-18 DIAGNOSIS — E11.9 CONTROLLED TYPE 2 DIABETES MELLITUS WITHOUT COMPLICATION, WITHOUT LONG-TERM CURRENT USE OF INSULIN (HCC): ICD-10-CM

## 2018-05-18 DIAGNOSIS — Z82.49 FAMILY HISTORY OF PREMATURE CAD: Chronic | ICD-10-CM

## 2018-05-18 PROCEDURE — 99204 OFFICE O/P NEW MOD 45 MIN: CPT | Performed by: INTERNAL MEDICINE

## 2018-05-18 PROCEDURE — 93000 ELECTROCARDIOGRAM COMPLETE: CPT | Performed by: INTERNAL MEDICINE

## 2018-05-18 NOTE — PROGRESS NOTES
Subjective:     Encounter Date:05/18/2018      Patient ID: Angelito Hart is a 52 y.o. male.    Chief Complaint:  History of Present Illness    Dear Lynn,    This patient comes into the office today for initial cardiac evaluation.  He comes in for evaluation of chest discomfort with multiple cardiac risk factors.    2 weeks ago patient had precordial chest discomfort that was fairly continuous over the weekend.  No radiation.  No associated nausea, vomiting, diaphoresis, or shortness of breath.  He was not associated with activity or exercise.  He has multiple cardiac risk factors.  He has morbid obesity, hypertension, hyperlipidemia, diabetes mellitus, and a family history of premature CAD.  He also has a history of tobacco abuse; he stopped smoking April 30.    Prior to this he was not having any other cardiac complaints. This patient has not experienced any feeling of palpitations, tachycardia or heart racing and no presyncope or syncope.  There has not been any problems with dizziness or lightheadedness.  There has not been any orthopnea or PND, and no problems with lower extremity edema.  This patient denies any shortness of breath at rest or with activity and has not had any wheezing.  This patient has not had any problems with unexplained nausea or vomiting. The patient has continued to perform daily activities of living without any specific problem or change in the level of activity.  This patient has not been recently hospitalized for any reason.    This patient has no known cardiac history.  This patient has no history of coronary artery disease, congestive heart failure, rheumatic fever, rheumatic heart disease, congenital heart disease or heart murmur.  This patient has never required invasive cardiovascular evaluation.    The following portions of the patient's history were reviewed and updated as appropriate: allergies, current medications, past family history, past medical history, past social  history, past surgical history and problem list.    Past Medical History:   Diagnosis Date   • Depression    • Diabetes mellitus    • Family history of premature CAD 5/18/2018   • Hypercalcemia    • Hyperlipidemia    • Hypertension    • Hypogonadism in male    • Increased PTH level    • Kidney stones    • Obesity    • Sleep apnea     uses cpap       Past Surgical History:   Procedure Laterality Date   • BRANCHIAL CLEFT CYST EXCISION     • COLONOSCOPY N/A 5/26/2017    Procedure: COLONOSCOPY INTO TERMINAL ILEUM WITH COLD BIOPSY POLYPECTOMIES;  Surgeon: Kelby Winter MD;  Location: [facility] ENDOSCOPY;  Service:    • CYSTOSCOPY      2007   • EXTRACORPOREAL SHOCK WAVE LITHOTRIPSY (ESWL) Left 9/15/2017    Procedure: EXTRACORPOREAL SHOCKWAVE LITHOTRIPSY WITH CYSTOSCOPY AND STENT PLACEMENT AND LEFT UTEROSCOPY;  Surgeon: Otilio Mcgrath MD;  [place] [facility] [place] [facility]  Service:    • EYE SURGERY     • TONSILLECTOMY         Social History     Social History   • Marital status:      Spouse name: N/A   • Number of children: N/A   • Years of education: N/A     Occupational History   • Not on file.     Social History Main Topics   • Smoking status: Former Smoker     Packs/day: 0.25     Types: Cigarettes     Quit date: 4/30/2018   • Smokeless tobacco: Not on file   • Alcohol use No   • Drug use: No   • Sexual activity: Defer     Other Topics Concern   • Not on file     Social History Narrative   • No narrative on file       Review of Systems   Constitution: Negative for chills, decreased appetite, fever and night sweats.   HENT: Negative for ear discharge, ear pain, hearing loss, nosebleeds and sore throat.    Eyes: Negative for blurred vision, double vision and pain.   Cardiovascular: Negative for cyanosis.   Respiratory: Negative for hemoptysis and sputum production.    Endocrine: Negative for cold intolerance and heat intolerance.   Hematologic/Lymphatic: Negative for adenopathy.   Skin: Negative for dry skin, itching,  "nail changes, rash and suspicious lesions.   Musculoskeletal: Negative for arthritis, gout, muscle cramps, muscle weakness, myalgias and neck pain.   Gastrointestinal: Negative for anorexia, bowel incontinence, constipation, diarrhea, dysphagia, hematemesis and jaundice.   Genitourinary: Negative for bladder incontinence, dysuria, flank pain, frequency, hematuria and nocturia.   Neurological: Negative for focal weakness, numbness, paresthesias and seizures.   Psychiatric/Behavioral: Negative for altered mental status, hallucinations, hypervigilance, suicidal ideas and thoughts of violence.   Allergic/Immunologic: Negative for persistent infections.         ECG 12 Lead  Date/Time: 5/18/2018 9:39 AM  Performed by: DEN FLANAGAN III.  Authorized by: DEN FLANAGAN III   Comparison: compared with previous ECG   Similar to previous ECG  Rhythm: sinus rhythm  Rate: normal  Conduction: conduction normal  ST Segments: ST segments normal  T Waves: T waves normal  QRS axis: normal  Other: no other findings  Clinical impression: normal ECG               Objective:     Vitals:    05/18/18 0915   BP: 138/88   Pulse: 82   Weight: 119 kg (263 lb)   Height: 180.3 cm (71\")         Physical Exam   Constitutional: He is oriented to person, place, and time. He appears well-developed and well-nourished. No distress.   HENT:   Head: Normocephalic and atraumatic.   Nose: Nose normal.   Mouth/Throat: Oropharynx is clear and moist.   Eyes: Conjunctivae and EOM are normal. Pupils are equal, round, and reactive to light. Right eye exhibits no discharge. Left eye exhibits no discharge.   Neck: Normal range of motion. Neck supple. No tracheal deviation present. No thyromegaly present.   Cardiovascular: Normal rate, regular rhythm, S1 normal, S2 normal, normal heart sounds and normal pulses.  Exam reveals no S3.    Pulmonary/Chest: Effort normal and breath sounds normal. No stridor. No respiratory distress. He exhibits no tenderness. "   Abdominal: Soft. Bowel sounds are normal. He exhibits no distension and no mass. There is no tenderness. There is no rebound and no guarding.   Musculoskeletal: Normal range of motion. He exhibits no tenderness or deformity.   Lymphadenopathy:     He has no cervical adenopathy.   Neurological: He is alert and oriented to person, place, and time. He has normal reflexes.   Skin: Skin is warm and dry. No rash noted. He is not diaphoretic. No erythema.   Psychiatric: He has a normal mood and affect. Thought content normal.       Lab Review:             Performed    Abdominal CT scan performed at [facility] in September 2017 is personally reviewed.    Lab data from [facility] is obtained and personally reviewed.    Medical records from [facility] are obtained and reviewed.      Assessment:          Diagnosis Plan   1. Precordial pain  ECG 12 Lead   2. Essential hypertension  ECG 12 Lead   3. Mixed hyperlipidemia  ECG 12 Lead   4. Controlled type 2 diabetes mellitus without complication, without long-term current use of insulin  ECG 12 Lead   5. Tobacco use  ECG 12 Lead   6. Family history of premature CAD     7. Obstructive sleep apnea syndrome            Plan:       1. Chest Pain- new problem with both typical and atypical components. Will arrange for a stress treadmill test. Further evaluation predicated on response.  2.Essential HTN- continue current medical therapy.  3. History of tobacco abuse-patient just stopped smoking 18 days ago.  We've provided further information on successful smoking cessation  4.  Obesity-focus on risk factor modification.  We provided information on this  5.  Family history of premature CAD-continue to focus on risk factor modification  6.  Diabetes mellitus without known complication  7.  Mixed hyperlipidemia-continue lipid-lowering therapy  Thank you very much for allowing us to participate in the care of this pleasant patient.  Please don't hesitate to call if I can be of assistance in any  way.      Current Outpatient Prescriptions:   •  aspirin 81 MG EC tablet, Take 81 mg by mouth Daily. holding, Disp: , Rfl:   •  Empagliflozin (JARDIANCE) 25 MG tablet, Take 25 mg by mouth Daily., Disp: , Rfl:   •  Lancet Device misc, , Disp: , Rfl:   •  lisinopril-hydrochlorothiazide (PRINZIDE,ZESTORETIC) 20-12.5 MG per tablet, Take 1 tablet by mouth Daily., Disp: , Rfl:   •  metFORMIN (GLUCOPHAGE) 1000 MG tablet, Take 1,000 mg by mouth 2 (Two) Times a Day With Meals., Disp: , Rfl:   •  simvastatin (ZOCOR) 40 MG tablet, Take 40 mg by mouth Every Night., Disp: , Rfl:   •  SITagliptin (JANUVIA) 100 MG tablet, Take 100 mg by mouth Daily., Disp: , Rfl:   •  Testosterone 20.25 MG/1.25GM (1.62%) gel, Place  on the skin. 4 pumps daily, Disp: , Rfl:   •  vitamin D (ERGOCALCIFEROL) 56928 units capsule capsule, Take 50,000 Units by mouth 1 (One) Time Per Week., Disp: , Rfl:          EMR Dragon/Transcription disclaimer:    Much of this encounter note is an electronic transcription/translation of spoken language to printed text. The electronic translation of spoken language may permit erroneous, or at times, nonsensical words or phrases to be inadvertently transcribed; Although I have reviewed the note for such errors, some may still exist.

## 2018-05-24 ENCOUNTER — HOSPITAL ENCOUNTER (OUTPATIENT)
Dept: CARDIOLOGY | Facility: HOSPITAL | Age: 53
Discharge: HOME | End: 2018-05-24
Attending: INTERNAL MEDICINE | Admitting: INTERNAL MEDICINE

## 2018-05-24 DIAGNOSIS — I10 ESSENTIAL HYPERTENSION: Chronic | ICD-10-CM

## 2018-05-24 DIAGNOSIS — E78.2 MIXED HYPERLIPIDEMIA: Chronic | ICD-10-CM

## 2018-05-24 DIAGNOSIS — E11.9 CONTROLLED TYPE 2 DIABETES MELLITUS WITHOUT COMPLICATION, WITHOUT LONG-TERM CURRENT USE OF INSULIN (HCC): ICD-10-CM

## 2018-05-24 DIAGNOSIS — R07.2 PRECORDIAL PAIN: ICD-10-CM

## 2018-05-24 DIAGNOSIS — Z82.49 FAMILY HISTORY OF PREMATURE CAD: Chronic | ICD-10-CM

## 2018-05-24 LAB
BH CV STRESS BP STAGE 1: NORMAL
BH CV STRESS BP STAGE 2: NORMAL
BH CV STRESS BP STAGE 3: NORMAL
BH CV STRESS DURATION MIN STAGE 1: 3
BH CV STRESS DURATION MIN STAGE 2: 3
BH CV STRESS DURATION MIN STAGE 3: 3
BH CV STRESS DURATION SEC STAGE 1: 0
BH CV STRESS DURATION SEC STAGE 2: 0
BH CV STRESS DURATION SEC STAGE 3: 0
BH CV STRESS GRADE STAGE 1: 10
BH CV STRESS GRADE STAGE 2: 12
BH CV STRESS GRADE STAGE 3: 14
BH CV STRESS HR STAGE 1: 115
BH CV STRESS HR STAGE 2: 129
BH CV STRESS HR STAGE 3: 150
BH CV STRESS METS STAGE 1: 5
BH CV STRESS METS STAGE 2: 7.5
BH CV STRESS METS STAGE 3: 10
BH CV STRESS PROTOCOL 1: NORMAL
BH CV STRESS RECOVERY BP: NORMAL MMHG
BH CV STRESS RECOVERY HR: 103 BPM
BH CV STRESS SPEED STAGE 1: 1.7
BH CV STRESS SPEED STAGE 2: 2.5
BH CV STRESS SPEED STAGE 3: 3.4
BH CV STRESS STAGE 1: 1
BH CV STRESS STAGE 2: 2
BH CV STRESS STAGE 3: 3
MAXIMAL PREDICTED HEART RATE: 168 BPM
PERCENT MAX PREDICTED HR: 89.29 %
STRESS BASELINE BP: NORMAL MMHG
STRESS BASELINE HR: 93 BPM
STRESS PERCENT HR: 105 %
STRESS POST ESTIMATED WORKLOAD: 10 METS
STRESS POST EXERCISE DUR MIN: 9 MIN
STRESS POST EXERCISE DUR SEC: 0 SEC
STRESS POST PEAK BP: NORMAL MMHG
STRESS POST PEAK HR: 150 BPM
STRESS TARGET HR: 143 BPM

## 2018-05-24 PROCEDURE — 93017 CV STRESS TEST TRACING ONLY: CPT

## 2018-05-24 PROCEDURE — 93018 CV STRESS TEST I&R ONLY: CPT | Performed by: INTERNAL MEDICINE

## 2018-05-24 PROCEDURE — 93016 CV STRESS TEST SUPVJ ONLY: CPT | Performed by: INTERNAL MEDICINE

## 2019-10-11 ENCOUNTER — ANESTHESIA EVENT (OUTPATIENT)
Dept: PERIOP | Facility: HOSPITAL | Age: 54
End: 2019-10-11

## 2019-10-11 ENCOUNTER — ANESTHESIA (OUTPATIENT)
Dept: PERIOP | Facility: HOSPITAL | Age: 54
End: 2019-10-11

## 2019-10-11 ENCOUNTER — HOSPITAL ENCOUNTER (OUTPATIENT)
Facility: HOSPITAL | Age: 54
Setting detail: HOSPITAL OUTPATIENT SURGERY
Discharge: HOME | End: 2019-10-11
Attending: UROLOGY | Admitting: UROLOGY

## 2019-10-11 VITALS
OXYGEN SATURATION: 95 % | SYSTOLIC BLOOD PRESSURE: 136 MMHG | WEIGHT: 250.22 LBS | BODY MASS INDEX: 35.03 KG/M2 | DIASTOLIC BLOOD PRESSURE: 71 MMHG | HEART RATE: 61 BPM | TEMPERATURE: 97.6 F | HEIGHT: 71 IN | RESPIRATION RATE: 16 BRPM

## 2019-10-11 PROBLEM — N20.0 RENAL CALCULUS, LEFT: Status: ACTIVE | Noted: 2019-10-11

## 2019-10-11 LAB — GLUCOSE BLDC GLUCOMTR-MCNC: 181 MG/DL (ref 70–130)

## 2019-10-11 PROCEDURE — 82962 GLUCOSE BLOOD TEST: CPT

## 2019-10-11 PROCEDURE — 25010000002 PROPOFOL 10 MG/ML EMULSION: Performed by: NURSE ANESTHETIST, CERTIFIED REGISTERED

## 2019-10-11 PROCEDURE — 25010000002 ONDANSETRON PER 1 MG: Performed by: NURSE ANESTHETIST, CERTIFIED REGISTERED

## 2019-10-11 PROCEDURE — 93010 ELECTROCARDIOGRAM REPORT: CPT | Performed by: INTERNAL MEDICINE

## 2019-10-11 PROCEDURE — 25010000002 LEVOFLOXACIN PER 250 MG: Performed by: UROLOGY

## 2019-10-11 PROCEDURE — 93005 ELECTROCARDIOGRAM TRACING: CPT | Performed by: STUDENT IN AN ORGANIZED HEALTH CARE EDUCATION/TRAINING PROGRAM

## 2019-10-11 PROCEDURE — 25010000002 KETOROLAC TROMETHAMINE PER 15 MG: Performed by: NURSE ANESTHETIST, CERTIFIED REGISTERED

## 2019-10-11 PROCEDURE — 25010000002 FENTANYL CITRATE (PF) 100 MCG/2ML SOLUTION: Performed by: NURSE ANESTHETIST, CERTIFIED REGISTERED

## 2019-10-11 RX ORDER — LABETALOL HYDROCHLORIDE 5 MG/ML
5 INJECTION, SOLUTION INTRAVENOUS
Status: DISCONTINUED | OUTPATIENT
Start: 2019-10-11 | End: 2019-10-11 | Stop reason: HOSPADM

## 2019-10-11 RX ORDER — MIDAZOLAM HYDROCHLORIDE 1 MG/ML
1 INJECTION INTRAMUSCULAR; INTRAVENOUS
Status: DISCONTINUED | OUTPATIENT
Start: 2019-10-11 | End: 2019-10-11 | Stop reason: HOSPADM

## 2019-10-11 RX ORDER — LIDOCAINE HYDROCHLORIDE 20 MG/ML
INJECTION, SOLUTION INFILTRATION; PERINEURAL AS NEEDED
Status: DISCONTINUED | OUTPATIENT
Start: 2019-10-11 | End: 2019-10-11 | Stop reason: SURG

## 2019-10-11 RX ORDER — PROPOFOL 10 MG/ML
VIAL (ML) INTRAVENOUS AS NEEDED
Status: DISCONTINUED | OUTPATIENT
Start: 2019-10-11 | End: 2019-10-11 | Stop reason: SURG

## 2019-10-11 RX ORDER — ONDANSETRON 2 MG/ML
INJECTION INTRAMUSCULAR; INTRAVENOUS AS NEEDED
Status: DISCONTINUED | OUTPATIENT
Start: 2019-10-11 | End: 2019-10-11 | Stop reason: SURG

## 2019-10-11 RX ORDER — SODIUM CHLORIDE, SODIUM LACTATE, POTASSIUM CHLORIDE, CALCIUM CHLORIDE 600; 310; 30; 20 MG/100ML; MG/100ML; MG/100ML; MG/100ML
9 INJECTION, SOLUTION INTRAVENOUS CONTINUOUS
Status: DISCONTINUED | OUTPATIENT
Start: 2019-10-11 | End: 2019-10-11 | Stop reason: HOSPADM

## 2019-10-11 RX ORDER — DIPHENHYDRAMINE HCL 25 MG
25 CAPSULE ORAL
Status: DISCONTINUED | OUTPATIENT
Start: 2019-10-11 | End: 2019-10-11 | Stop reason: HOSPADM

## 2019-10-11 RX ORDER — EPHEDRINE SULFATE 50 MG/ML
5 INJECTION, SOLUTION INTRAVENOUS ONCE AS NEEDED
Status: DISCONTINUED | OUTPATIENT
Start: 2019-10-11 | End: 2019-10-11 | Stop reason: HOSPADM

## 2019-10-11 RX ORDER — FENTANYL CITRATE 50 UG/ML
50 INJECTION, SOLUTION INTRAMUSCULAR; INTRAVENOUS
Status: DISCONTINUED | OUTPATIENT
Start: 2019-10-11 | End: 2019-10-11 | Stop reason: HOSPADM

## 2019-10-11 RX ORDER — FENTANYL CITRATE 50 UG/ML
INJECTION, SOLUTION INTRAMUSCULAR; INTRAVENOUS AS NEEDED
Status: DISCONTINUED | OUTPATIENT
Start: 2019-10-11 | End: 2019-10-11 | Stop reason: SURG

## 2019-10-11 RX ORDER — LIDOCAINE HYDROCHLORIDE 10 MG/ML
0.5 INJECTION, SOLUTION EPIDURAL; INFILTRATION; INTRACAUDAL; PERINEURAL ONCE AS NEEDED
Status: COMPLETED | OUTPATIENT
Start: 2019-10-11 | End: 2019-10-11

## 2019-10-11 RX ORDER — SULFAMETHOXAZOLE AND TRIMETHOPRIM 800; 160 MG/1; MG/1
1 TABLET ORAL 2 TIMES DAILY
Qty: 6 TABLET | Refills: 0 | Status: SHIPPED | OUTPATIENT
Start: 2019-10-11 | End: 2019-12-31

## 2019-10-11 RX ORDER — ACETAMINOPHEN 650 MG/1
650 SUPPOSITORY RECTAL ONCE AS NEEDED
Status: DISCONTINUED | OUTPATIENT
Start: 2019-10-11 | End: 2019-10-11 | Stop reason: HOSPADM

## 2019-10-11 RX ORDER — HYDRALAZINE HYDROCHLORIDE 20 MG/ML
5 INJECTION INTRAMUSCULAR; INTRAVENOUS
Status: DISCONTINUED | OUTPATIENT
Start: 2019-10-11 | End: 2019-10-11 | Stop reason: HOSPADM

## 2019-10-11 RX ORDER — DIPHENHYDRAMINE HYDROCHLORIDE 50 MG/ML
12.5 INJECTION INTRAMUSCULAR; INTRAVENOUS
Status: DISCONTINUED | OUTPATIENT
Start: 2019-10-11 | End: 2019-10-11 | Stop reason: HOSPADM

## 2019-10-11 RX ORDER — OXYCODONE AND ACETAMINOPHEN 7.5; 325 MG/1; MG/1
1 TABLET ORAL ONCE AS NEEDED
Status: DISCONTINUED | OUTPATIENT
Start: 2019-10-11 | End: 2019-10-11 | Stop reason: HOSPADM

## 2019-10-11 RX ORDER — KETOROLAC TROMETHAMINE 30 MG/ML
INJECTION, SOLUTION INTRAMUSCULAR; INTRAVENOUS AS NEEDED
Status: DISCONTINUED | OUTPATIENT
Start: 2019-10-11 | End: 2019-10-11 | Stop reason: SURG

## 2019-10-11 RX ORDER — NALOXONE HCL 0.4 MG/ML
0.2 VIAL (ML) INJECTION AS NEEDED
Status: DISCONTINUED | OUTPATIENT
Start: 2019-10-11 | End: 2019-10-11 | Stop reason: HOSPADM

## 2019-10-11 RX ORDER — SODIUM CHLORIDE 0.9 % (FLUSH) 0.9 %
3 SYRINGE (ML) INJECTION EVERY 12 HOURS SCHEDULED
Status: DISCONTINUED | OUTPATIENT
Start: 2019-10-11 | End: 2019-10-11 | Stop reason: HOSPADM

## 2019-10-11 RX ORDER — ACETAMINOPHEN 325 MG/1
650 TABLET ORAL ONCE AS NEEDED
Status: DISCONTINUED | OUTPATIENT
Start: 2019-10-11 | End: 2019-10-11 | Stop reason: HOSPADM

## 2019-10-11 RX ORDER — SODIUM CHLORIDE 0.9 % (FLUSH) 0.9 %
3-10 SYRINGE (ML) INJECTION AS NEEDED
Status: DISCONTINUED | OUTPATIENT
Start: 2019-10-11 | End: 2019-10-11 | Stop reason: HOSPADM

## 2019-10-11 RX ORDER — LEVOFLOXACIN 5 MG/ML
500 INJECTION, SOLUTION INTRAVENOUS ONCE
Status: COMPLETED | OUTPATIENT
Start: 2019-10-11 | End: 2019-10-11

## 2019-10-11 RX ORDER — HYDROCODONE BITARTRATE AND ACETAMINOPHEN 7.5; 325 MG/1; MG/1
1-2 TABLET ORAL EVERY 4 HOURS PRN
Qty: 20 TABLET | Refills: 0 | Status: SHIPPED | OUTPATIENT
Start: 2019-10-11 | End: 2019-12-31

## 2019-10-11 RX ORDER — GLYCOPYRROLATE 0.2 MG/ML
INJECTION INTRAMUSCULAR; INTRAVENOUS AS NEEDED
Status: DISCONTINUED | OUTPATIENT
Start: 2019-10-11 | End: 2019-10-11 | Stop reason: SURG

## 2019-10-11 RX ORDER — FAMOTIDINE 10 MG/ML
20 INJECTION, SOLUTION INTRAVENOUS ONCE
Status: COMPLETED | OUTPATIENT
Start: 2019-10-11 | End: 2019-10-11

## 2019-10-11 RX ORDER — MIDAZOLAM HYDROCHLORIDE 1 MG/ML
2 INJECTION INTRAMUSCULAR; INTRAVENOUS
Status: DISCONTINUED | OUTPATIENT
Start: 2019-10-11 | End: 2019-10-11 | Stop reason: HOSPADM

## 2019-10-11 RX ORDER — HYDROCODONE BITARTRATE AND ACETAMINOPHEN 7.5; 325 MG/1; MG/1
1 TABLET ORAL ONCE AS NEEDED
Status: COMPLETED | OUTPATIENT
Start: 2019-10-11 | End: 2019-10-11

## 2019-10-11 RX ORDER — ONDANSETRON 2 MG/ML
4 INJECTION INTRAMUSCULAR; INTRAVENOUS ONCE AS NEEDED
Status: DISCONTINUED | OUTPATIENT
Start: 2019-10-11 | End: 2019-10-11 | Stop reason: HOSPADM

## 2019-10-11 RX ADMIN — FAMOTIDINE 20 MG: 10 INJECTION INTRAVENOUS at 09:52

## 2019-10-11 RX ADMIN — SODIUM CHLORIDE, POTASSIUM CHLORIDE, SODIUM LACTATE AND CALCIUM CHLORIDE: 600; 310; 30; 20 INJECTION, SOLUTION INTRAVENOUS at 12:07

## 2019-10-11 RX ADMIN — FENTANYL CITRATE 25 MCG: 50 INJECTION INTRAMUSCULAR; INTRAVENOUS at 11:41

## 2019-10-11 RX ADMIN — PROPOFOL 200 MG: 10 INJECTION, EMULSION INTRAVENOUS at 11:33

## 2019-10-11 RX ADMIN — KETOROLAC TROMETHAMINE 30 MG: 30 INJECTION, SOLUTION INTRAMUSCULAR; INTRAVENOUS at 11:46

## 2019-10-11 RX ADMIN — SODIUM CHLORIDE, POTASSIUM CHLORIDE, SODIUM LACTATE AND CALCIUM CHLORIDE 9 ML/HR: 600; 310; 30; 20 INJECTION, SOLUTION INTRAVENOUS at 09:26

## 2019-10-11 RX ADMIN — LIDOCAINE HYDROCHLORIDE 80 MG: 20 INJECTION, SOLUTION INFILTRATION; PERINEURAL at 11:33

## 2019-10-11 RX ADMIN — LIDOCAINE HYDROCHLORIDE 0.5 ML: 10 INJECTION, SOLUTION EPIDURAL; INFILTRATION; INTRACAUDAL; PERINEURAL at 09:03

## 2019-10-11 RX ADMIN — GLYCOPYRROLATE 0.2 MG: 0.2 INJECTION INTRAMUSCULAR; INTRAVENOUS at 12:03

## 2019-10-11 RX ADMIN — HYDROCODONE BITARTRATE AND ACETAMINOPHEN 1 TABLET: 7.5; 325 TABLET ORAL at 12:55

## 2019-10-11 RX ADMIN — LEVOFLOXACIN 500 MG: 5 INJECTION, SOLUTION INTRAVENOUS at 10:56

## 2019-10-11 RX ADMIN — PROPOFOL 20 MG: 10 INJECTION, EMULSION INTRAVENOUS at 11:40

## 2019-10-11 RX ADMIN — ONDANSETRON 4 MG: 2 INJECTION INTRAMUSCULAR; INTRAVENOUS at 11:45

## 2019-10-11 NOTE — ANESTHESIA PROCEDURE NOTES
Airway  Urgency: elective    Date/Time: 10/11/2019 11:35 AM  Airway not difficult    General Information and Staff    Patient location during procedure: OR  CRNA: Cordelia Howard CRNA    Indications and Patient Condition  Indications for airway management: airway protection    Preoxygenated: yes  Mask difficulty assessment: 1 - vent by mask    Final Airway Details  Final airway type: supraglottic airway      Successful airway: classic  Size 5    Number of attempts at approach: 1    Additional Comments  LMA placed easily.  Cuff MOP.

## 2019-10-11 NOTE — ANESTHESIA POSTPROCEDURE EVALUATION
Patient: Angelito Hart    Procedure Summary     Date:  10/11/19 Room / Location:   [facility]  /  [facility]    Anesthesia Start:  1131 Anesthesia Stop:  1220    Procedure:  EXTRACORPOREAL SHOCKWAVE LITHOTRIPSY LEFT (Left ) Diagnosis:      Surgeon:  Otilio Mcgrath MD Provider:  Jason Edouard MD    Anesthesia Type:  general ASA Status:  3          Anesthesia Type: general  Last vitals  BP   136/71 (10/11/19 1303)   Temp   36.4 °C (97.6 °F) (10/11/19 1258)   Pulse   61 (10/11/19 1303)   Resp   16 (10/11/19 1303)     SpO2   95 % (10/11/19 1303)     Post Anesthesia Care and Evaluation    Patient location during evaluation: bedside  Patient participation: complete - patient participated  Level of consciousness: awake and alert  Pain management: adequate  Airway patency: patent  Anesthetic complications: No anesthetic complications  PONV Status: controlled  Cardiovascular status: blood pressure returned to baseline and acceptable  Respiratory status: acceptable  Hydration status: acceptable    Comments: Patient was found to be in bigeminy in PACU, A 12 lead EKG obtained. No new findings concerning for ischemia. Patient was also noted to have a hx of PVCs on a prior stress test in 2018. He is not endorsing chest pain, SOB or light headedness at this time. No further workup warranted, plan to discharge home.

## 2019-10-11 NOTE — NURSING NOTE
Upon arrival to PACU, monitor showing frequent PACs. Vital signs stable. Pt asymptomatic. Dr. Edouard notified of this and requested an EKG. EKG results showed ventricular bigeminy. Dr. Edouard called to bedside to evaluate. No new orders at this time.

## 2019-10-11 NOTE — ANESTHESIA PREPROCEDURE EVALUATION
Anesthesia Evaluation     Patient summary reviewed and Nursing notes reviewed   no history of anesthetic complications:  NPO Solid Status: > 8 hours  NPO Liquid Status: > 2 hours           Airway   Mallampati: II  TM distance: >3 FB  Neck ROM: full  No difficulty expected  Dental - normal exam     Pulmonary     breath sounds clear to auscultation  (+) sleep apnea,   Cardiovascular   Exercise tolerance: good (4-7 METS)    Rhythm: irregular  Rate: normal    (+) hypertension, hyperlipidemia,       Neuro/Psych  (+) psychiatric history Depression,     GI/Hepatic/Renal/Endo    (+) obesity,   renal disease stones, diabetes mellitus,     Musculoskeletal     Abdominal     Abdomen: soft.   Substance History      OB/GYN          Other                      Anesthesia Plan    ASA 3     general     intravenous induction   Anesthetic plan, all risks, benefits, and alternatives have been provided, discussed and informed consent has been obtained with: patient.    Plan discussed with CRNA.

## 2019-10-11 NOTE — OP NOTE
The patient was taken to the lithotripsy suite and placed under general anesthesia in supine position.  Bi-planar fluoroscopic imaging was used to identify and target the 9 mm stone in the left kidney ESWL was commenced using 18 kV at 60 shocks/minute for 300 shocks followed by a 3 minute pause.  The rate was increased to 120 shocks/minute at rapidly escalating voltage to 24 kV.  Intermittent fluoroscopy to confirm proper stone targeting was used throughout the case.  Good pulverization was observed.  Operative Report     [facility]    Patient: Angelito Hart  Age:      54 y.o.  :     1965  Sex:      male    Medical Record:  [id number]    Date of Operation/Procedure:  10/11/2019    Pre-op Diagnosis:   Left renal calculus    Post-Op Diagnosis Codes:  Same    Pre-operative Diagnosis Free Text:  * No pre-op diagnosis entered *     Name of Operation/Procedure:  Procedure(s) and Anesthesia Type:     * EXTRACORPOREAL SHOCKWAVE LITHOTRIPSY LEFT - General    Findings/Complications: Lower pole left stone 9 mm  Description of procedure: Successful induction of general anesthesia patient was placed in the litho-suite under fluoroscopic control to planes we imaged a 9 mm stone in the lower pole of the left kidney.  We treated the patient with a total of 3000 shocks maximum power setting of 24 KV.  Patient tolerated procedure without complication we will follow-up with Dr. Josse Garcia in 2 weeks with a KUB discharge medications included analgesics and antibiotics  Estimated Blood Loss: none    Specimens: * No orders in the log *    Fluids/Drains: None    Otilio Mcgrath MD  10/11/2019  11:43 AM

## 2019-10-11 NOTE — H&P
FIRST UROLOGY CONSULT      Patient Identification:  NAME:  Angelito Hart  Age:  54 y.o.   Sex:  male   :  1965   MRN:  [id number]       Chief complaint: Left renal calculus    History of present illness: 54-year-old gentleman with past history of stone disease has a 9 mm lower pole left stone scheduled for surgical treatment.      Past medical history:  Past Medical History:   Diagnosis Date   • Depression    • Diabetes mellitus (CMS/HCC)     TYPE 2   • Family history of premature CAD 2018   • Hypercalcemia    • Hyperlipidemia    • Hypertension    • Hypogonadism in male    • Increased PTH level    • Kidney stones    • Obesity    • Sleep apnea     uses cpap       Past surgical history:  Past Surgical History:   Procedure Laterality Date   • BRANCHIAL CLEFT CYST EXCISION     • COLONOSCOPY N/A 2017    Procedure: COLONOSCOPY INTO TERMINAL ILEUM WITH COLD BIOPSY POLYPECTOMIES;  Surgeon: Kelby Winter MD;  Location: [facility] ENDOSCOPY;  Service:    • CYSTOSCOPY         • EXTRACORPOREAL SHOCK WAVE LITHOTRIPSY (ESWL) Left 9/15/2017    Procedure: EXTRACORPOREAL SHOCKWAVE LITHOTRIPSY WITH CYSTOSCOPY AND STENT PLACEMENT AND LEFT UTEROSCOPY;  Surgeon: Otilio Mcgrath MD;  Location: [facility] OR [facility];  Service:    • EYE SURGERY     • TONSILLECTOMY         Allergies:  Penicillins and Sertraline hcl    Home medications:  Medications Prior to Admission   Medication Sig Dispense Refill Last Dose   • aspirin 81 MG EC tablet Take 81 mg by mouth Daily. holding   10/4/2019   • Empagliflozin (JARDIANCE) 25 MG tablet Take 25 mg by mouth Daily.   10/10/2019   • Lancet Device misc    10/10/2019   • lisinopril-hydrochlorothiazide (PRINZIDE,ZESTORETIC) 20-12.5 MG per tablet Take 1 tablet by mouth Daily.   10/10/2019   • metFORMIN (GLUCOPHAGE) 1000 MG tablet Take 1,000 mg by mouth 2 (Two) Times a Day With Meals.   10/10/2019   • simvastatin (ZOCOR) 40 MG tablet Take 40 mg by mouth Every Night.   10/10/2019   •  SITagliptin (JANUVIA) 100 MG tablet Take 100 mg by mouth Daily.   10/10/2019   • vitamin D (ERGOCALCIFEROL) 63346 units capsule capsule Take 50,000 Units by mouth 1 (One) Time Per Week.   10/5/2019        Hospital medications:    levoFLOXacin (LEVAQUIN) 500 mg/100 mL D5W (premix) 500 mg Intravenous Once   sodium chloride 3 mL Intravenous Q12H       lactated ringers 9 mL/hr Last Rate: 9 mL/hr (10/11/19 0926)     midazolam **OR** midazolam  •  sodium chloride    Family history:  Family History   Problem Relation Age of Onset   • Diabetes Mother    • Heart attack Father 54   • Heart disease Father 54   • Diabetes Father    • Sudden death Father    • Heart failure Maternal Uncle    • Diabetes Maternal Grandmother    • Heart attack Paternal Grandfather         unknown   • Diabetes Son    • Malig Hyperthermia Neg Hx        Social history:  Social History     Tobacco Use   • Smoking status: Current Every Day Smoker     Packs/day: 0.50     Types: Cigarettes   • Smokeless tobacco: Never Used   Substance Use Topics   • Alcohol use: No   • Drug use: No       Review of systems:      Positive for: Past history of ureteroscopy for kidney stones  Negative for:      Objective:  TMax 24 hours:   Temp (24hrs), Av.3 °F (36.8 °C), Min:98.3 °F (36.8 °C), Max:98.3 °F (36.8 °C)      Vitals Ranges:   Temp:  [98.3 °F (36.8 °C)] 98.3 °F (36.8 °C)  Heart Rate:  [71] 71  Resp:  [16] 16  BP: (143-155)/() 143/87    Intake/Output Last 3 shifts:  No intake/output data recorded.     Physical Exam:    General Appearance:    Alert, cooperative, NAD   HEENT:    No trauma, pupils reactive, hearing intact   Back:     No CVA tenderness   Lungs:     Respirations unlabored, no wheezing    Heart:    RRR, intact peripheral pulses   Abdomen:     Soft, NDNT, no masses, no guarding   :    Pelvic not performed, bladder non distended and non tender   Extremities:   No edema, no deformity   Lymphatic:   No neck or groin LAD   Skin:   No bleeding,  bruising or rashes   Neuro/Psych:   Orientation intact, mood/affect pleasant, no focal findings       Results review:   I reviewed the patient's new clinical results.    Data review:  Lab Results (last 24 hours)     Procedure Component Value Units Date/Time    POC Glucose Once [[id number]]  (Abnormal) Collected:  10/11/19 0902    Specimen:  Blood Updated:  10/11/19 0906     Glucose 181 mg/dL            Imaging:  Imaging Results (last 24 hours)     ** No results found for the last 24 hours. **             Assessment:       * No active hospital problems. *    Left renal calculus    Plan:     Left ESWL    Otilio Mcgrath MD  10/11/19  10:47 AM           FIRST UROLOGY CONSULT      Patient Identification:  NAME:  Angelito Hart  Age:  54 y.o.   Sex:  male   :  1965   MRN:  [id number]       Chief complaint:     History of present illness:        Past medical history:  Past Medical History:   Diagnosis Date   • Depression    • Diabetes mellitus (CMS/HCC)     TYPE 2   • Family history of premature CAD 2018   • Hypercalcemia    • Hyperlipidemia    • Hypertension    • Hypogonadism in male    • Increased PTH level    • Kidney stones    • Obesity    • Sleep apnea     uses cpap       Past surgical history:  Past Surgical History:   Procedure Laterality Date   • BRANCHIAL CLEFT CYST EXCISION     • COLONOSCOPY N/A 2017    Procedure: COLONOSCOPY INTO TERMINAL ILEUM WITH COLD BIOPSY POLYPECTOMIES;  Surgeon: Kelby Winter MD;  Location: [facility] ENDOSCOPY;  Service:    • CYSTOSCOPY         • EXTRACORPOREAL SHOCK WAVE LITHOTRIPSY (ESWL) Left 9/15/2017    Procedure: EXTRACORPOREAL SHOCKWAVE LITHOTRIPSY WITH CYSTOSCOPY AND STENT PLACEMENT AND LEFT UTEROSCOPY;  Surgeon: Otilio Mcgrath MD;  [place] [facility] [place] [facility] [place]  Service:    • EYE SURGERY     • TONSILLECTOMY         Allergies:  Penicillins and Sertraline hcl    Home medications:  Medications Prior to Admission   Medication Sig Dispense Refill Last Dose    • aspirin 81 MG EC tablet Take 81 mg by mouth Daily. holding   10/4/2019   • Empagliflozin (JARDIANCE) 25 MG tablet Take 25 mg by mouth Daily.   10/10/2019   • Lancet Device misc    10/10/2019   • lisinopril-hydrochlorothiazide (PRINZIDE,ZESTORETIC) 20-12.5 MG per tablet Take 1 tablet by mouth Daily.   10/10/2019   • metFORMIN (GLUCOPHAGE) 1000 MG tablet Take 1,000 mg by mouth 2 (Two) Times a Day With Meals.   10/10/2019   • simvastatin (ZOCOR) 40 MG tablet Take 40 mg by mouth Every Night.   10/10/2019   • SITagliptin (JANUVIA) 100 MG tablet Take 100 mg by mouth Daily.   10/10/2019   • vitamin D (ERGOCALCIFEROL) 15041 units capsule capsule Take 50,000 Units by mouth 1 (One) Time Per Week.   10/5/2019        Hospital medications:    levoFLOXacin (LEVAQUIN) 500 mg/100 mL D5W (premix) 500 mg Intravenous Once   sodium chloride 3 mL Intravenous Q12H       lactated ringers 9 mL/hr Last Rate: 9 mL/hr (10/11/19 09)     midazolam **OR** midazolam  •  sodium chloride    Family history:  Family History   Problem Relation Age of Onset   • Diabetes Mother    • Heart attack Father 54   • Heart disease Father 54   • Diabetes Father    • Sudden death Father    • Heart failure Maternal Uncle    • Diabetes Maternal Grandmother    • Heart attack Paternal Grandfather         unknown   • Diabetes Son    • Malig Hyperthermia Neg Hx        Social history:  Social History     Tobacco Use   • Smoking status: Current Every Day Smoker     Packs/day: 0.50     Types: Cigarettes   • Smokeless tobacco: Never Used   Substance Use Topics   • Alcohol use: No   • Drug use: No       Review of systems:      Positive for:    Negative for:      Objective:  TMax 24 hours:   Temp (24hrs), Av.3 °F (36.8 °C), Min:98.3 °F (36.8 °C), Max:98.3 °F (36.8 °C)      Vitals Ranges:   Temp:  [98.3 °F (36.8 °C)] 98.3 °F (36.8 °C)  Heart Rate:  [71] 71  Resp:  [16] 16  BP: (143-155)/() 143/87    Intake/Output Last 3 shifts:  No intake/output data recorded.      Physical Exam:    General Appearance:    Alert, cooperative, NAD   HEENT:    No trauma, pupils reactive, hearing intact   Back:     No CVA tenderness   Lungs:     Respirations unlabored, no wheezing    Heart:    RRR, intact peripheral pulses   Abdomen:     Soft, NDNT, no masses, no guarding   :    Testes descended bilaterally, no nodules.  Penis normal.  No scrotal or penile rashes noted   Extremities:   No edema, no deformity   Lymphatic:   No neck or groin LAD   Skin:   No bleeding, bruising or rashes   Neuro/Psych:   Orientation intact, mood/affect pleasant, no focal findings       Results review:   I reviewed the patient's new clinical results.    Data review:  Lab Results (last 24 hours)     Procedure Component Value Units Date/Time    POC Glucose Once [[id number]]  (Abnormal) Collected:  10/11/19 0902    Specimen:  Blood Updated:  10/11/19 0906     Glucose 181 mg/dL            Imaging:  Imaging Results (last 24 hours)     ** No results found for the last 24 hours. **             Assessment:       * No active hospital problems. *        Plan:         Otilio Mcgrath MD  10/11/19  10:47 AM                 FIRST UROLOGY CONSULT      Patient Identification:  NAME:  Angelito Hart  Age:  54 y.o.   Sex:  male   :  1965   MRN:  [id number]       Chief complaint:     History of present illness:        Past medical history:  Past Medical History:   Diagnosis Date   • Depression    • Diabetes mellitus (CMS/HCC)     TYPE 2   • Family history of premature CAD 2018   • Hypercalcemia    • Hyperlipidemia    • Hypertension    • Hypogonadism in male    • Increased PTH level    • Kidney stones    • Obesity    • Sleep apnea     uses cpap       Past surgical history:  Past Surgical History:   Procedure Laterality Date   • BRANCHIAL CLEFT CYST EXCISION     • COLONOSCOPY N/A 2017    Procedure: COLONOSCOPY INTO TERMINAL ILEUM WITH COLD BIOPSY POLYPECTOMIES;  Surgeon: Kelby Winter MD;  Location:   [facility] ENDOSCOPY;  Service:    • CYSTOSCOPY      2007   • EXTRACORPOREAL SHOCK WAVE LITHOTRIPSY (ESWL) Left 9/15/2017    Procedure: EXTRACORPOREAL SHOCKWAVE LITHOTRIPSY WITH CYSTOSCOPY AND STENT PLACEMENT AND LEFT UTEROSCOPY;  Surgeon: Otilio Mcgrath MD;  Location[facility] [place] [facility];  Service:    • EYE SURGERY     • TONSILLECTOMY         Allergies:  Penicillins and Sertraline hcl    Home medications:  Medications Prior to Admission   Medication Sig Dispense Refill Last Dose   • aspirin 81 MG EC tablet Take 81 mg by mouth Daily. holding   10/4/2019   • Empagliflozin (JARDIANCE) 25 MG tablet Take 25 mg by mouth Daily.   10/10/2019   • Lancet Device misc    10/10/2019   • lisinopril-hydrochlorothiazide (PRINZIDE,ZESTORETIC) 20-12.5 MG per tablet Take 1 tablet by mouth Daily.   10/10/2019   • metFORMIN (GLUCOPHAGE) 1000 MG tablet Take 1,000 mg by mouth 2 (Two) Times a Day With Meals.   10/10/2019   • simvastatin (ZOCOR) 40 MG tablet Take 40 mg by mouth Every Night.   10/10/2019   • SITagliptin (JANUVIA) 100 MG tablet Take 100 mg by mouth Daily.   10/10/2019   • vitamin D (ERGOCALCIFEROL) 51971 units capsule capsule Take 50,000 Units by mouth 1 (One) Time Per Week.   10/5/2019        Hospital medications:    levoFLOXacin (LEVAQUIN) 500 mg/100 mL D5W (premix) 500 mg Intravenous Once   sodium chloride 3 mL Intravenous Q12H       lactated ringers 9 mL/hr Last Rate: 9 mL/hr (10/11/19 0926)     midazolam **OR** midazolam  •  sodium chloride    Family history:  Family History   Problem Relation Age of Onset   • Diabetes Mother    • Heart attack Father 54   • Heart disease Father 54   • Diabetes Father    • Sudden death Father    • Heart failure Maternal Uncle    • Diabetes Maternal Grandmother    • Heart attack Paternal Grandfather         unknown   • Diabetes Son    • Malig Hyperthermia Neg Hx        Social history:  Social History     Tobacco Use   • Smoking status: Current Every Day Smoker     Packs/day: 0.50     Types:  Cigarettes   • Smokeless tobacco: Never Used   Substance Use Topics   • Alcohol use: No   • Drug use: No       Review of systems:      Positive for:    Negative for:      Objective:  TMax 24 hours:   Temp (24hrs), Av.3 °F (36.8 °C), Min:98.3 °F (36.8 °C), Max:98.3 °F (36.8 °C)      Vitals Ranges:   Temp:  [98.3 °F (36.8 °C)] 98.3 °F (36.8 °C)  Heart Rate:  [71] 71  Resp:  [16] 16  BP: (143-155)/() 143/87    Intake/Output Last 3 shifts:  No intake/output data recorded.     Physical Exam:    General Appearance:    Alert, cooperative, NAD   HEENT:    No trauma, pupils reactive, hearing intact   Back:     No CVA tenderness   Lungs:     Respirations unlabored, no wheezing    Heart:    RRR, intact peripheral pulses   Abdomen:     Soft, NDNT, no masses, no guarding   :    Testes descended bilaterally, no nodules.  Penis normal.  No scrotal or penile rashes noted   Extremities:   No edema, no deformity   Lymphatic:   No neck or groin LAD   Skin:   No bleeding, bruising or rashes   Neuro/Psych:   Orientation intact, mood/affect pleasant, no focal findings       Results review:   I reviewed the patient's new clinical results.    Data review:  Lab Results (last 24 hours)     Procedure Component Value Units Date/Time    POC Glucose Once [[id number]]  (Abnormal) Collected:  10/11/19 0902    Specimen:  Blood Updated:  10/11/19 0906     Glucose 181 mg/dL            Imaging:  Imaging Results (last 24 hours)     ** No results found for the last 24 hours. **             Assessment:       * No active hospital problems. *        Plan:         Otilio Mcgrath MD  10/11/19  10:47 AM

## 2019-12-31 ENCOUNTER — OFFICE VISIT (OUTPATIENT)
Dept: CARDIOLOGY | Facility: CLINIC | Age: 54
End: 2019-12-31

## 2019-12-31 VITALS
DIASTOLIC BLOOD PRESSURE: 70 MMHG | HEIGHT: 71 IN | WEIGHT: 258.4 LBS | HEART RATE: 102 BPM | BODY MASS INDEX: 36.18 KG/M2 | SYSTOLIC BLOOD PRESSURE: 144 MMHG

## 2019-12-31 DIAGNOSIS — I49.3 PVC (PREMATURE VENTRICULAR CONTRACTION): ICD-10-CM

## 2019-12-31 DIAGNOSIS — I10 ESSENTIAL HYPERTENSION: Chronic | ICD-10-CM

## 2019-12-31 DIAGNOSIS — G47.33 OBSTRUCTIVE SLEEP APNEA SYNDROME: Chronic | ICD-10-CM

## 2019-12-31 DIAGNOSIS — Z01.810 ENCOUNTER FOR PRE-OPERATIVE CARDIOVASCULAR CLEARANCE: Primary | ICD-10-CM

## 2019-12-31 DIAGNOSIS — R94.31 ABNORMAL EKG: ICD-10-CM

## 2019-12-31 DIAGNOSIS — R00.0 TACHYCARDIA: ICD-10-CM

## 2019-12-31 DIAGNOSIS — E66.09 CLASS 2 OBESITY DUE TO EXCESS CALORIES WITHOUT SERIOUS COMORBIDITY WITH BODY MASS INDEX (BMI) OF 36.0 TO 36.9 IN ADULT: ICD-10-CM

## 2019-12-31 PROBLEM — Z72.0 TOBACCO USE: Status: RESOLVED | Noted: 2017-04-06 | Resolved: 2019-12-31

## 2019-12-31 PROBLEM — E66.812 CLASS 2 OBESITY DUE TO EXCESS CALORIES WITHOUT SERIOUS COMORBIDITY WITH BODY MASS INDEX (BMI) OF 36.0 TO 36.9 IN ADULT: Status: ACTIVE | Noted: 2017-07-19

## 2019-12-31 PROBLEM — E66.9 OBESITY: Status: ACTIVE | Noted: 2017-07-19

## 2019-12-31 PROCEDURE — 93000 ELECTROCARDIOGRAM COMPLETE: CPT | Performed by: NURSE PRACTITIONER

## 2019-12-31 PROCEDURE — 99243 OFF/OP CNSLTJ NEW/EST LOW 30: CPT | Performed by: NURSE PRACTITIONER

## 2019-12-31 NOTE — PROGRESS NOTES
Date of Office Visit: 2019  Encounter Provider: ERNESTINE Donnelly  Place of Service: [facility] CARDIOLOGY  Patient Name: Angelito Hart  :1965  Primary Cardiologist: Dr. Omar Alvarado     Chief Complaint   Patient presents with   • Pre-op Exam   • Abnormal ECG   :     Dear Dr. Garcia,     HPI: Angelito Hart is a pleasant 54 y.o. male who presents today for perioperative cardiac risk assessment for future cystoscopy with possible stent placement.  This visit is a consultation requested by Dr. Josse Garcia.     The patient has a past medical history of type 2 diabetes mellitus, hypertension, hyperlipidemia, obesity, and obstructive sleep apnea (compliant with CPAP machine).  His last sleep apnea evaluation was in .  He has been a longtime cigarette smoker and quit 11 weeks ago.  He reports a strong family history of coronary artery disease with his grandfather before 50 years of age and his father  at age 54 with a history of CAD, CABG, diabetes, neuropathy.    In May 2018, he was having chest pain and was evaluated by Dr. Omar Alvarado.  He denied chest pain or shortness of breath at that time.  Dr. Alvarado ordered a treadmill stress test which was completed on 2018 which showed the following: Normal EKG stress test, frequent PVCs that decreased with exercise and returned in recovery, and he exercised for 9 minutes.  He was asymptomatic for the PVCs and no further work-up was recommended.    He presents today for further evaluation.  He denies chest pain, shortness of air, PND, orthopnea, cough, edema, palpitations, dizziness, syncope, or bleeding.  Both his blood pressure and heart rate are elevated today.    Past Medical History:   Diagnosis Date   • Depression    • Diabetes mellitus (CMS/HCC)     TYPE 2   • Family history of premature CAD 2018   • Hypercalcemia    • Hyperlipidemia    • Hypertension    • Hypogonadism in male    • Increased  PTH level    • Kidney stones    • Obesity    • Sleep apnea     uses cpap       Past Surgical History:   Procedure Laterality Date   • BRANCHIAL CLEFT CYST EXCISION     • COLONOSCOPY N/A 2017    Procedure: COLONOSCOPY INTO TERMINAL ILEUM WITH COLD BIOPSY POLYPECTOMIES;  Surgeon: Kelby Winter MD;  Location: [facility] ENDOSCOPY;  Service:    • CYSTOSCOPY         • EXTRACORPOREAL SHOCK WAVE LITHOTRIPSY (ESWL) Left 9/15/2017    Procedure: EXTRACORPOREAL SHOCKWAVE LITHOTRIPSY WITH CYSTOSCOPY AND STENT PLACEMENT AND LEFT UTEROSCOPY;  Surgeon: Otilio Mcgrath MD;  [place] [facility] [place] [facility]  Service:    • EXTRACORPOREAL SHOCK WAVE LITHOTRIPSY (ESWL) Left 10/11/2019    Procedure: EXTRACORPOREAL SHOCKWAVE LITHOTRIPSY LEFT;  Surgeon: Otilio Mcgrath MD;  [place] [facility] [place] [facility] [place];  Service: Urology   • EYE SURGERY     • TONSILLECTOMY         Social History     Socioeconomic History   • Marital status:      Spouse name: Not on file   • Number of children: Not on file   • Years of education: Not on file   • Highest education level: Not on file   Tobacco Use   • Smoking status: Former Smoker     Packs/day: 0.00     Types: Cigarettes     Last attempt to quit: 10/27/2019     Years since quittin.1   • Smokeless tobacco: Never Used   Substance and Sexual Activity   • Alcohol use: No     Comment: Caffeine use   • Drug use: No   • Sexual activity: Defer       Family History   Problem Relation Age of Onset   • Diabetes Mother    • Heart attack Father 54   • Heart disease Father 54   • Diabetes Father    • Sudden death Father    • Heart failure Maternal Uncle    • Diabetes Maternal Grandmother    • Heart attack Paternal Grandfather         unknown   • Diabetes Son    • Malig Hyperthermia Neg Hx        The following portion of the patient's history were reviewed and updated as appropriate: past medical history, past surgical history, past social history, past family history, allergies, current medications, and  problem list.    Review of Systems   Constitution: Negative for chills, diaphoresis, fever, malaise/fatigue, night sweats, weight gain and weight loss.   HENT: Negative for hearing loss, nosebleeds, sore throat and tinnitus.    Eyes: Negative for blurred vision, double vision, pain and visual disturbance.   Cardiovascular: Negative for chest pain, claudication, cyanosis, dyspnea on exertion, irregular heartbeat, leg swelling, near-syncope, orthopnea, palpitations, paroxysmal nocturnal dyspnea and syncope.   Respiratory: Negative for cough, hemoptysis, shortness of breath, snoring and wheezing.    Endocrine: Negative for cold intolerance, heat intolerance and polyuria.   Hematologic/Lymphatic: Negative for bleeding problem. Does not bruise/bleed easily.   Skin: Positive for rash. Negative for color change, dry skin, flushing and itching.   Musculoskeletal: Negative for falls, joint pain, joint swelling, muscle cramps, muscle weakness and myalgias.   Gastrointestinal: Negative for abdominal pain, constipation, heartburn, melena, nausea and vomiting.   Genitourinary: Negative for dysuria and hematuria.   Neurological: Negative for excessive daytime sleepiness, dizziness, light-headedness, loss of balance, numbness, paresthesias, seizures and vertigo.   Psychiatric/Behavioral: Negative for altered mental status, depression, memory loss and substance abuse. The patient does not have insomnia and is not nervous/anxious.    Allergic/Immunologic: Negative for environmental allergies.       Allergies   Allergen Reactions   • Penicillins Other (See Comments)     AS CHILD   • Sertraline Hcl Other (See Comments)     skaking         Current Outpatient Medications:   •  aspirin 81 MG EC tablet, Take 81 mg by mouth Daily. holding, Disp: , Rfl:   •  Empagliflozin (JARDIANCE) 25 MG tablet, Take 25 mg by mouth Daily., Disp: , Rfl:   •  Lancet Device misc, , Disp: , Rfl:   •  lisinopril-hydrochlorothiazide (PRINZIDE,ZESTORETIC)  "20-12.5 MG per tablet, Take 1 tablet by mouth Daily., Disp: , Rfl:   •  metFORMIN (GLUCOPHAGE) 1000 MG tablet, Take 1,000 mg by mouth 2 (Two) Times a Day With Meals., Disp: , Rfl:   •  simvastatin (ZOCOR) 40 MG tablet, Take 40 mg by mouth Every Night., Disp: , Rfl:   •  SITagliptin (JANUVIA) 100 MG tablet, Take 100 mg by mouth Daily., Disp: , Rfl:   •  vitamin D (ERGOCALCIFEROL) 05007 units capsule capsule, Take 50,000 Units by mouth 1 (One) Time Per Week., Disp: , Rfl:         Objective:     Vitals:    12/31/19 0901 12/31/19 0909   BP: 142/70 144/70   BP Location: Left arm Right arm   Pulse: 102    Weight: 117 kg (258 lb 6.4 oz)    Height: 180.3 cm (71\")      Body mass index is 36.04 kg/m².    PHYSICAL EXAM:    Vitals Reviewed.   General Appearance: No acute distress, well developed and well nourished. Obese.   Eyes: Conjunctiva and lids: No erythema, swelling, or discharge. Sclera non-icteric.   HENT: Atraumatic, normocephalic. External eyes, ears, and nose normal. No hearing loss noted. Mucous membranes normal. Lips not cyanotic. Neck supple with no tenderness.  Respiratory: No signs of respiratory distress. Respiration rhythm and depth normal.   Clear to auscultation. No rales, crackles, rhonchi, or wheezing auscultated.   Cardiovascular:  Jugular Venous Pressure: Normal  Heart Rate and Rhythm: Normal with occasional ectopy. Heart Sounds: Normal S1 and S2. No S3 or S4 noted.  Murmurs: No murmurs noted. No rubs, thrills, or gallops.   Lower Extremities: No edema noted.  Gastrointestinal:  Abdomen soft, non-distended, non-tender. Normal bowel sounds. No hepatomegaly.   Musculoskeletal: Normal movement of extremities  Skin and Nails: General appearance normal. No pallor, cyanosis, diaphoresis. Skin temperature normal. No clubbing of fingernails.   Psychiatric: Patient alert and oriented to person, place, and time. Speech and behavior appropriate. Normal mood and affect.       ECG 12 Lead  Date/Time: 12/31/2019 9:03 " AM  Performed by: Susanne Bo APRN  Authorized by: Susanne Bo APRN   Comparison: compared with previous ECG from 10/11/2019  Comparison to previous ECG: Normal sinus rhythm with frequent PVCs  Rhythm: sinus tachycardia  Ectopy: multifocal PVCs  Rate: tachycardic  BPM: 102  Conduction: conduction normal  QRS axis: normal  Other findings: non-specific ST-T wave changes and poor R wave progression    Clinical impression: abnormal EKG              Assessment:       Diagnosis Plan   1. Encounter for pre-operative cardiovascular clearance  Adult Transthoracic Echo Complete W/ Cont if Necessary Per Protocol    Holter Monitor - 24 Hour   2. Abnormal EKG     3. PVC (premature ventricular contraction)  Adult Transthoracic Echo Complete W/ Cont if Necessary Per Protocol    Holter Monitor - 24 Hour   4. Tachycardia  Adult Transthoracic Echo Complete W/ Cont if Necessary Per Protocol    Holter Monitor - 24 Hour   5. Essential hypertension  Adult Transthoracic Echo Complete W/ Cont if Necessary Per Protocol    Holter Monitor - 24 Hour   6. Obstructive sleep apnea syndrome     7. Class 2 obesity due to excess calories without serious comorbidity with body mass index (BMI) of 36.0 to 36.9 in adult            Plan:       1.  Perioperative Cardiac Risk Assessment: He has been recommended to undergo cystoscopy with possible stent placement by Dr. Josse Garcia.  During his last procedure in October 2019, Dr. Parra noted that he had ventricular bigeminy during the procedure.  During the stress test in May 2018, he was noted to have frequent PVCs that improved with exercise and reappeared in recovery.  Dr. Alvarado felt these were stable and he was asymptomatic.  On today's EKG he was noted to have PVCs and once again is asymptomatic.  In preparation for his upcoming surgery, I have recommended a 2D echocardiogram and Holter monitor to be completed to quantify the amounts of PVCs.  Further recommendations to  follow.    2/3/4: Abnormal EKG/PVCs/Tachycardia: His EKG today shows sinus tachycardia with a heart rate of 102 bpm and multifocal PVCs.  I reviewed his recent blood work which included a TSH and electrolytes from December 2019 which were within normal limits.  I explained the common causes of PVCs including obesity, caffeine intake, nicotine, stress, lack of exercise, and sleep apnea.  He recently quit smoking 11 weeks ago.    5.  Hypertension: Blood pressure borderline elevated today.    6.  Obstructive Sleep Apnea: He said his last sleep apnea evaluation was in 2012.  I explained that he could have more frequent PVCs due to inadequately treated sleep apnea.  I have recommended that he further discuss with his sleep apnea specialist, Dr. Peralta.    7.  Obesity: BMI is 36.1.  He would benefit from exercise, weight loss, and heart healthy diet.    8.  Further recommendations to follow.    ADDENDUM 1/10/2020: He wore a Holter monitor which showed normal sinus rhythm with frequent PVCs accounting for 32% of his total heartbeats.  I recommended the start of metoprolol succinate 50 mg 1 tablet at nighttime.  I think that he is at acceptable risk to proceed with his urological procedure.  According to the [facility] Revised Cardiac Risk Index, he is at very low (0.4%) of adverse cardiac events occurring with moderate risk surgery.  I have sent a letter to his urologist, Dr. Garcia.    As far as the PVCs, he is asymptomatic.  But due to the frequency I do think that beta-blocker therapy is warranted.  He had a treadmill stress test in 2018 which showed that the PVCs improved with exercise and are considered benign.  He had an echocardiogram which showed normal ejection fraction and no wall motion abnormalities.  I think a lot of this is his lifestyle and I have recommended decreasing the caffeine, continuing to abstain from nicotine, weight loss, regular exercise, sleep apnea check, and decreasing any stress in his life.  I  have referred him to [facility] as his last sleep apnea study was in 2012.  He is compliant with his current CPAP machine.  All this was well explained to the patient over 35 minutes and he verbalizes understanding.  He will follow-up with Dr. Alvarado in 4-6 weeks.    As always, it has been a pleasure to participate in your patient's care. Thank you.       Sincerely,         ERNESTINE Macdonald        Dictated utilizing Dragon dictation

## 2020-01-03 ENCOUNTER — HOSPITAL ENCOUNTER (OUTPATIENT)
Dept: CARDIOLOGY | Facility: HOSPITAL | Age: 55
Discharge: HOME | End: 2020-01-03
Admitting: NURSE PRACTITIONER

## 2020-01-03 VITALS
HEART RATE: 90 BPM | WEIGHT: 260 LBS | BODY MASS INDEX: 36.4 KG/M2 | SYSTOLIC BLOOD PRESSURE: 124 MMHG | HEIGHT: 71 IN | DIASTOLIC BLOOD PRESSURE: 78 MMHG

## 2020-01-03 DIAGNOSIS — I49.3 PVC (PREMATURE VENTRICULAR CONTRACTION): ICD-10-CM

## 2020-01-03 DIAGNOSIS — Z01.810 ENCOUNTER FOR PRE-OPERATIVE CARDIOVASCULAR CLEARANCE: ICD-10-CM

## 2020-01-03 DIAGNOSIS — R00.0 TACHYCARDIA: ICD-10-CM

## 2020-01-03 DIAGNOSIS — I10 ESSENTIAL HYPERTENSION: ICD-10-CM

## 2020-01-03 LAB
AORTIC ARCH: 3 CM
AORTIC ROOT ANNULUS: 2.3 CM
ASCENDING AORTA: 3.4 CM
BH CV ECHO MEAS - ACS: 2.6 CM
BH CV ECHO MEAS - AO MAX PG (FULL): 4.2 MMHG
BH CV ECHO MEAS - AO MAX PG: 9 MMHG
BH CV ECHO MEAS - AO MEAN PG (FULL): 3 MMHG
BH CV ECHO MEAS - AO MEAN PG: 5.8 MMHG
BH CV ECHO MEAS - AO ROOT AREA (BSA CORRECTED): 1.4
BH CV ECHO MEAS - AO ROOT AREA: 8.6 CM^2
BH CV ECHO MEAS - AO ROOT DIAM: 3.3 CM
BH CV ECHO MEAS - AO V2 MAX: 150.3 CM/SEC
BH CV ECHO MEAS - AO V2 MEAN: 114.7 CM/SEC
BH CV ECHO MEAS - AO V2 VTI: 31.2 CM
BH CV ECHO MEAS - AVA(I,A): 2.5 CM^2
BH CV ECHO MEAS - AVA(I,D): 2.5 CM^2
BH CV ECHO MEAS - AVA(V,A): 3 CM^2
BH CV ECHO MEAS - AVA(V,D): 3 CM^2
BH CV ECHO MEAS - BSA(HAYCOCK): 2.5 M^2
BH CV ECHO MEAS - BSA: 2.4 M^2
BH CV ECHO MEAS - BZI_BMI: 36.3 KILOGRAMS/M^2
BH CV ECHO MEAS - BZI_METRIC_HEIGHT: 180.3 CM
BH CV ECHO MEAS - BZI_METRIC_WEIGHT: 117.9 KG
BH CV ECHO MEAS - EDV(MOD-SP2): 91 ML
BH CV ECHO MEAS - EDV(MOD-SP4): 99 ML
BH CV ECHO MEAS - EDV(TEICH): 99.9 ML
BH CV ECHO MEAS - EF(CUBED): 58.4 %
BH CV ECHO MEAS - EF(MOD-BP): 59 %
BH CV ECHO MEAS - EF(MOD-SP2): 59.3 %
BH CV ECHO MEAS - EF(MOD-SP4): 58.6 %
BH CV ECHO MEAS - EF(TEICH): 50.1 %
BH CV ECHO MEAS - ESV(MOD-SP2): 37 ML
BH CV ECHO MEAS - ESV(MOD-SP4): 41 ML
BH CV ECHO MEAS - ESV(TEICH): 49.9 ML
BH CV ECHO MEAS - FS: 25.4 %
BH CV ECHO MEAS - IVS/LVPW: 1.1
BH CV ECHO MEAS - IVSD: 1.2 CM
BH CV ECHO MEAS - LAT PEAK E' VEL: 11 CM/SEC
BH CV ECHO MEAS - LV DIASTOLIC VOL/BSA (35-75): 42 ML/M^2
BH CV ECHO MEAS - LV MASS(C)D: 208.6 GRAMS
BH CV ECHO MEAS - LV MASS(C)DI: 88.5 GRAMS/M^2
BH CV ECHO MEAS - LV MAX PG: 4.8 MMHG
BH CV ECHO MEAS - LV MEAN PG: 2.8 MMHG
BH CV ECHO MEAS - LV SYSTOLIC VOL/BSA (12-30): 17.4 ML/M^2
BH CV ECHO MEAS - LV V1 MAX: 109.4 CM/SEC
BH CV ECHO MEAS - LV V1 MEAN: 78.1 CM/SEC
BH CV ECHO MEAS - LV V1 VTI: 18.6 CM
BH CV ECHO MEAS - LVIDD: 4.7 CM
BH CV ECHO MEAS - LVIDS: 3.5 CM
BH CV ECHO MEAS - LVLD AP2: 8.9 CM
BH CV ECHO MEAS - LVLD AP4: 8.9 CM
BH CV ECHO MEAS - LVLS AP2: 7.5 CM
BH CV ECHO MEAS - LVLS AP4: 6.8 CM
BH CV ECHO MEAS - LVOT AREA (M): 4.2 CM^2
BH CV ECHO MEAS - LVOT AREA: 4.1 CM^2
BH CV ECHO MEAS - LVOT DIAM: 2.3 CM
BH CV ECHO MEAS - LVPWD: 1.2 CM
BH CV ECHO MEAS - MED PEAK E' VEL: 7 CM/SEC
BH CV ECHO MEAS - MV A DUR: 0.08 SEC
BH CV ECHO MEAS - MV A MAX VEL: 91.3 CM/SEC
BH CV ECHO MEAS - MV DEC SLOPE: 420.9 CM/SEC^2
BH CV ECHO MEAS - MV DEC TIME: 0.13 SEC
BH CV ECHO MEAS - MV E MAX VEL: 84.2 CM/SEC
BH CV ECHO MEAS - MV E/A: 0.92
BH CV ECHO MEAS - MV MAX PG: 4.5 MMHG
BH CV ECHO MEAS - MV MEAN PG: 2.7 MMHG
BH CV ECHO MEAS - MV P1/2T MAX VEL: 95 CM/SEC
BH CV ECHO MEAS - MV P1/2T: 66.1 MSEC
BH CV ECHO MEAS - MV V2 MAX: 105.7 CM/SEC
BH CV ECHO MEAS - MV V2 MEAN: 78.3 CM/SEC
BH CV ECHO MEAS - MV V2 VTI: 19.9 CM
BH CV ECHO MEAS - MVA P1/2T LCG: 2.3 CM^2
BH CV ECHO MEAS - MVA(P1/2T): 3.3 CM^2
BH CV ECHO MEAS - MVA(VTI): 3.9 CM^2
BH CV ECHO MEAS - PA MAX PG (FULL): 3 MMHG
BH CV ECHO MEAS - PA MAX PG: 4.5 MMHG
BH CV ECHO MEAS - PA V2 MAX: 105.9 CM/SEC
BH CV ECHO MEAS - PULM A REVS DUR: 0.09 SEC
BH CV ECHO MEAS - PULM A REVS VEL: 33.7 CM/SEC
BH CV ECHO MEAS - PULM DIAS VEL: 50.5 CM/SEC
BH CV ECHO MEAS - PULM S/D: 1.3
BH CV ECHO MEAS - PULM SYS VEL: 63.4 CM/SEC
BH CV ECHO MEAS - PVA(V,A): 3 CM^2
BH CV ECHO MEAS - PVA(V,D): 3 CM^2
BH CV ECHO MEAS - QP/QS: 0.71
BH CV ECHO MEAS - RV MAX PG: 1.5 MMHG
BH CV ECHO MEAS - RV MEAN PG: 1.1 MMHG
BH CV ECHO MEAS - RV V1 MAX: 60.8 CM/SEC
BH CV ECHO MEAS - RV V1 MEAN: 50.7 CM/SEC
BH CV ECHO MEAS - RV V1 VTI: 10.3 CM
BH CV ECHO MEAS - RVOT AREA: 5.3 CM^2
BH CV ECHO MEAS - RVOT DIAM: 2.6 CM
BH CV ECHO MEAS - SI(AO): 113.7 ML/M^2
BH CV ECHO MEAS - SI(CUBED): 24.9 ML/M^2
BH CV ECHO MEAS - SI(LVOT): 32.4 ML/M^2
BH CV ECHO MEAS - SI(MOD-SP2): 22.9 ML/M^2
BH CV ECHO MEAS - SI(MOD-SP4): 24.6 ML/M^2
BH CV ECHO MEAS - SI(TEICH): 21.2 ML/M^2
BH CV ECHO MEAS - SUP REN AO DIAM: 2.1 CM
BH CV ECHO MEAS - SV(AO): 268 ML
BH CV ECHO MEAS - SV(CUBED): 58.8 ML
BH CV ECHO MEAS - SV(LVOT): 76.5 ML
BH CV ECHO MEAS - SV(MOD-SP2): 54 ML
BH CV ECHO MEAS - SV(MOD-SP4): 58 ML
BH CV ECHO MEAS - SV(RVOT): 54.6 ML
BH CV ECHO MEAS - SV(TEICH): 50 ML
BH CV ECHO MEAS - TAPSE (>1.6): 3 CM2
BH CV ECHO MEASUREMENTS AVERAGE E/E' RATIO: 9.36
BH CV XLRA - RV BASE: 3.6 CM
BH CV XLRA - RV LENGTH: 8.6 CM
BH CV XLRA - RV MID: 2.5 CM
BH CV XLRA - TDI S': 20 CM/SEC
LEFT ATRIUM VOLUME INDEX: 24 ML/M2
LEFT ATRIUM VOLUME: 56 CM3
LV EF 2D ECHO EST: 59 %
MAXIMAL PREDICTED HEART RATE: 166 BPM
SINUS: 3.2 CM
STJ: 3.3 CM
STRESS TARGET HR: 141 BPM

## 2020-01-03 PROCEDURE — 93306 TTE W/DOPPLER COMPLETE: CPT

## 2020-01-03 PROCEDURE — 93306 TTE W/DOPPLER COMPLETE: CPT | Performed by: INTERNAL MEDICINE

## 2020-01-09 ENCOUNTER — TELEPHONE (OUTPATIENT)
Dept: CARDIOLOGY | Facility: CLINIC | Age: 55
End: 2020-01-09

## 2020-01-09 DIAGNOSIS — G47.33 OBSTRUCTIVE SLEEP APNEA: Primary | ICD-10-CM

## 2020-01-09 NOTE — TELEPHONE ENCOUNTER
Monitor Procedure     Study Description Monitor placed on patient by [facility] on 1/3/2020 at 07:49 EST. The monitor was scanned on 1/7/2020. The patient was monitored for 8 hours and 34 minutes. Indications for this exam include Ventricular premature depolarization. Total beats: 08505. Average HR: 86. Min HR: 63. Max HR: 115.   Study Findings No symptoms reported during the monitoring period. No complications noted. The predominant rhythm noted during the testing period was sinus rhythm. Premature atrial contractions occured rarely. There was no evidence of atrial arrhythmias. There were no episodes of supraventricular tachycardia. Premature ventricular contractions occured frequently. There were no episodes of ventricular tachycardia. Sinoatrial node conduction was normal. No atrioventricular block noted.   Study Impressions A relatively benign monitor study.     Result Text     · Borderline abnormal study  · PVCs, 32.3% of the total.

## 2020-01-10 RX ORDER — METOPROLOL SUCCINATE 50 MG/1
50 TABLET, EXTENDED RELEASE ORAL DAILY
Qty: 30 TABLET | Refills: 3 | Status: SHIPPED | OUTPATIENT
Start: 2020-01-10 | End: 2020-05-06

## 2020-01-10 NOTE — TELEPHONE ENCOUNTER
I reviewed the results of Holter monitor woth patient. I have recommended that he start metoprolol succinate 50 mg 1 tablet at nighttime to help suppress PVCs. He will call with any concerns about his blood pressure and heart rate. We discussed the possible adverse effects of beta blocker therapy.    Patient is considered at acceptable risk for surgery from a cardiovascular standpoint. According to the [facility]'s Revised Cardiac Risk Index, patient is considered very low risk (0.4%) of adverse cardiovascular events occurring with moderate risk surgery.      We discussed the risk factors for PVCs and he will decrease his diet coke intake. I have referred him for sleep apnea consultation. He will focus on weight loss and exercise. He is a former smoker and quit in October 2019.     I would recommend a follow up with Dr. Alvarado in 4-6 weeks. He verbalized understanding. Tariq - please arrange appt.

## 2020-01-13 NOTE — TELEPHONE ENCOUNTER
Gregory Skinner is wondering if this appointment should be schedule before or after his surgery.   Please advise.   Thanks,  Tariq

## 2020-01-14 NOTE — TELEPHONE ENCOUNTER
Spoke with pt and he will call back once he has his procedure scheduled and schedule an appt to see Dr. Alvarado

## 2020-01-20 ENCOUNTER — APPOINTMENT (OUTPATIENT)
Dept: PREADMISSION TESTING | Facility: HOSPITAL | Age: 55
End: 2020-01-20

## 2020-01-20 VITALS
OXYGEN SATURATION: 96 % | SYSTOLIC BLOOD PRESSURE: 135 MMHG | TEMPERATURE: 97.3 F | DIASTOLIC BLOOD PRESSURE: 73 MMHG | RESPIRATION RATE: 16 BRPM | HEIGHT: 71 IN | HEART RATE: 60 BPM | WEIGHT: 259.7 LBS | BODY MASS INDEX: 36.36 KG/M2

## 2020-01-20 LAB
ANION GAP SERPL CALCULATED.3IONS-SCNC: 13.8 MMOL/L (ref 5–15)
BUN BLD-MCNC: 12 MG/DL (ref 6–20)
BUN/CREAT SERPL: 11.5 (ref 7–25)
CALCIUM SPEC-SCNC: 10.1 MG/DL (ref 8.6–10.5)
CHLORIDE SERPL-SCNC: 96 MMOL/L (ref 98–107)
CO2 SERPL-SCNC: 26.2 MMOL/L (ref 22–29)
CREAT BLD-MCNC: 1.04 MG/DL (ref 0.76–1.27)
DEPRECATED RDW RBC AUTO: 40.5 FL (ref 37–54)
ERYTHROCYTE [DISTWIDTH] IN BLOOD BY AUTOMATED COUNT: 12.3 % (ref 12.3–15.4)
GFR SERPL CREATININE-BSD FRML MDRD: 74 ML/MIN/1.73
GLUCOSE BLD-MCNC: 182 MG/DL (ref 65–99)
HCT VFR BLD AUTO: 49.1 % (ref 37.5–51)
HGB BLD-MCNC: 17.3 G/DL (ref 13–17.7)
MCH RBC QN AUTO: 31.5 PG (ref 26.6–33)
MCHC RBC AUTO-ENTMCNC: 35.2 G/DL (ref 31.5–35.7)
MCV RBC AUTO: 89.4 FL (ref 79–97)
PLATELET # BLD AUTO: 269 10*3/MM3 (ref 140–450)
PMV BLD AUTO: 10.6 FL (ref 6–12)
POTASSIUM BLD-SCNC: 4.3 MMOL/L (ref 3.5–5.2)
RBC # BLD AUTO: 5.49 10*6/MM3 (ref 4.14–5.8)
SODIUM BLD-SCNC: 136 MMOL/L (ref 136–145)
WBC NRBC COR # BLD: 10.08 10*3/MM3 (ref 3.4–10.8)

## 2020-01-20 PROCEDURE — 36415 COLL VENOUS BLD VENIPUNCTURE: CPT

## 2020-01-20 PROCEDURE — 85027 COMPLETE CBC AUTOMATED: CPT | Performed by: UROLOGY

## 2020-01-20 PROCEDURE — 80048 BASIC METABOLIC PNL TOTAL CA: CPT | Performed by: UROLOGY

## 2020-01-20 NOTE — DISCHARGE INSTRUCTIONS
Take the following medications the morning of surgery with a small sip of water:    NONE    ARRIVE AT 1430.        General Instructions:  • Do not eat or drink anything after midnight the night before surgery.  • Infants may have breast milk up to four hours before surgery.  • Infants drinking formula may drink formula up to six hours before surgery.   • Patients who avoid smoking, chewing tobacco and alcohol for 4 weeks prior to surgery have a reduced risk of post-operative complications.  Quit smoking as many days before surgery as you can.  • Do not smoke, use chewing tobacco or drink alcohol the day of surgery.   • If applicable bring your C-PAP/ BI-PAP machine.  • Bring any papers given to you in the doctor’s office.  • Wear clean comfortable clothes.  • Do not wear contact lenses, false eyelashes or make-up.  Bring a case for your glasses.   • Bring crutches or walker if applicable.  • Remove all piercings.  Leave jewelry and any other valuables at home.  • Hair extensions with metal clips must be removed prior to surgery.  • The Pre-Admission Testing nurse will instruct you to bring medications if unable to obtain an accurate list in Pre-Admission Testing.        If you were given a blood bank ID arm band remember to bring it with you the day of surgery.    Preventing a Surgical Site Infection:  • For 2 to 3 days before surgery, avoid shaving with a razor because the razor can irritate skin and make it easier to develop an infection.    • Any areas of open skin can increase the risk of a post-operative wound infection by allowing bacteria to enter and travel throughout the body.  Notify your surgeon if you have any skin wounds / rashes even if it is not near the expected surgical site.  The area will need assessed to determine if surgery should be delayed until it is healed.  • The night prior to surgery sleep in a clean bed with clean clothing.  Do not allow pets to sleep with you.  • Shower on the morning  of surgery using a fresh bar of anti-bacterial soap (such as Dial) and clean washcloth.  Dry with a clean towel and dress in clean clothing.  • Ask your surgeon if you will be receiving antibiotics prior to surgery.  • Make sure you, your family, and all healthcare providers clean their hands with soap and water or an alcohol based hand  before caring for you or your wound.    Day of surgery:  Your arrival time is approximately two hours before your scheduled surgery time.  Upon arrival, a Pre-op nurse and Anesthesiologist will review your health history, obtain vital signs, and answer questions you may have.  The only belongings needed at this time will be your home medications and if applicable your C-PAP/BI-PAP machine.  If you are staying overnight your family can leave the rest of your belongings in the car and bring them to your room later.  A Pre-op nurse will start an IV and you may receive medication in preparation for surgery, including something to help you relax.  Your family will be able to see you in the Pre-op area.  Two visitors at a time will be allowed in the Pre-op room.  While you are in surgery your family should notify the waiting room  if they leave the waiting room area and provide a contact phone number.    Please be aware that surgery does come with discomfort.  We want to make every effort to control your discomfort so please discuss any uncontrolled symptoms with your nurse.   Your doctor will most likely have prescribed pain medications.      If you are going home after surgery you will receive individualized written care instructions before being discharged.  A responsible adult must drive you to and from the hospital on the day of your surgery and stay with you for 24 hours.    If you are staying overnight following surgery, you will be transported to your hospital room following the recovery period.  [facility] has all private rooms.    If you have  any questions please call Pre-Admission Testing at [phone].  Deductibles and co-payments are collected on the day of service. Please be prepared to pay the required co-pay, deductible or deposit on the day of service as defined by your plan.

## 2020-01-24 ENCOUNTER — ANESTHESIA EVENT (OUTPATIENT)
Dept: PERIOP | Facility: HOSPITAL | Age: 55
End: 2020-01-24

## 2020-01-24 ENCOUNTER — ANESTHESIA (OUTPATIENT)
Dept: PERIOP | Facility: HOSPITAL | Age: 55
End: 2020-01-24

## 2020-01-24 ENCOUNTER — APPOINTMENT (OUTPATIENT)
Dept: GENERAL RADIOLOGY | Facility: HOSPITAL | Age: 55
End: 2020-01-24

## 2020-01-24 ENCOUNTER — HOSPITAL ENCOUNTER (OUTPATIENT)
Facility: HOSPITAL | Age: 55
Setting detail: HOSPITAL OUTPATIENT SURGERY
Discharge: HOME | End: 2020-01-24
Attending: UROLOGY | Admitting: UROLOGY

## 2020-01-24 VITALS
HEIGHT: 71 IN | DIASTOLIC BLOOD PRESSURE: 84 MMHG | WEIGHT: 260.06 LBS | SYSTOLIC BLOOD PRESSURE: 156 MMHG | BODY MASS INDEX: 36.41 KG/M2 | RESPIRATION RATE: 18 BRPM | OXYGEN SATURATION: 95 % | TEMPERATURE: 97.9 F | HEART RATE: 64 BPM

## 2020-01-24 DIAGNOSIS — N20.1 LEFT URETERAL STONE: ICD-10-CM

## 2020-01-24 LAB
GLUCOSE BLDC GLUCOMTR-MCNC: 140 MG/DL (ref 70–130)
GLUCOSE BLDC GLUCOMTR-MCNC: 171 MG/DL (ref 70–130)

## 2020-01-24 PROCEDURE — C2617 STENT, NON-COR, TEM W/O DEL: HCPCS | Performed by: UROLOGY

## 2020-01-24 PROCEDURE — 25010000002 ONDANSETRON PER 1 MG: Performed by: ANESTHESIOLOGY

## 2020-01-24 PROCEDURE — 82962 GLUCOSE BLOOD TEST: CPT

## 2020-01-24 PROCEDURE — 82360 CALCULUS ASSAY QUANT: CPT | Performed by: UROLOGY

## 2020-01-24 PROCEDURE — 74018 RADEX ABDOMEN 1 VIEW: CPT

## 2020-01-24 PROCEDURE — 25010000002 LEVOFLOXACIN PER 250 MG: Performed by: UROLOGY

## 2020-01-24 PROCEDURE — 76000 FLUOROSCOPY <1 HR PHYS/QHP: CPT

## 2020-01-24 PROCEDURE — C1894 INTRO/SHEATH, NON-LASER: HCPCS | Performed by: UROLOGY

## 2020-01-24 PROCEDURE — 25010000002 MIDAZOLAM PER 1 MG: Performed by: ANESTHESIOLOGY

## 2020-01-24 PROCEDURE — 25010000002 PROPOFOL 10 MG/ML EMULSION: Performed by: ANESTHESIOLOGY

## 2020-01-24 DEVICE — URETERAL STENT
Type: IMPLANTABLE DEVICE | Site: URETER | Status: FUNCTIONAL
Brand: POLARIS™ ULTRA

## 2020-01-24 RX ORDER — MIDAZOLAM HYDROCHLORIDE 1 MG/ML
1 INJECTION INTRAMUSCULAR; INTRAVENOUS
Status: DISCONTINUED | OUTPATIENT
Start: 2020-01-24 | End: 2020-01-24 | Stop reason: HOSPADM

## 2020-01-24 RX ORDER — LEVOFLOXACIN 5 MG/ML
500 INJECTION, SOLUTION INTRAVENOUS ONCE
Status: COMPLETED | OUTPATIENT
Start: 2020-01-24 | End: 2020-01-24

## 2020-01-24 RX ORDER — PROMETHAZINE HYDROCHLORIDE 25 MG/ML
12.5 INJECTION, SOLUTION INTRAMUSCULAR; INTRAVENOUS ONCE AS NEEDED
Status: DISCONTINUED | OUTPATIENT
Start: 2020-01-24 | End: 2020-01-24 | Stop reason: HOSPADM

## 2020-01-24 RX ORDER — MAGNESIUM HYDROXIDE 1200 MG/15ML
LIQUID ORAL AS NEEDED
Status: DISCONTINUED | OUTPATIENT
Start: 2020-01-24 | End: 2020-01-24 | Stop reason: HOSPADM

## 2020-01-24 RX ORDER — HYDROCODONE BITARTRATE AND ACETAMINOPHEN 5; 325 MG/1; MG/1
2 TABLET ORAL ONCE AS NEEDED
Status: COMPLETED | OUTPATIENT
Start: 2020-01-24 | End: 2020-01-24

## 2020-01-24 RX ORDER — SODIUM CHLORIDE, SODIUM LACTATE, POTASSIUM CHLORIDE, CALCIUM CHLORIDE 600; 310; 30; 20 MG/100ML; MG/100ML; MG/100ML; MG/100ML
9 INJECTION, SOLUTION INTRAVENOUS CONTINUOUS
Status: DISCONTINUED | OUTPATIENT
Start: 2020-01-24 | End: 2020-01-24 | Stop reason: HOSPADM

## 2020-01-24 RX ORDER — FENTANYL CITRATE 50 UG/ML
50 INJECTION, SOLUTION INTRAMUSCULAR; INTRAVENOUS
Status: DISCONTINUED | OUTPATIENT
Start: 2020-01-24 | End: 2020-01-24 | Stop reason: HOSPADM

## 2020-01-24 RX ORDER — PROPOFOL 10 MG/ML
VIAL (ML) INTRAVENOUS AS NEEDED
Status: DISCONTINUED | OUTPATIENT
Start: 2020-01-24 | End: 2020-01-24 | Stop reason: SURG

## 2020-01-24 RX ORDER — PROMETHAZINE HYDROCHLORIDE 25 MG/1
25 SUPPOSITORY RECTAL ONCE AS NEEDED
Status: DISCONTINUED | OUTPATIENT
Start: 2020-01-24 | End: 2020-01-24 | Stop reason: HOSPADM

## 2020-01-24 RX ORDER — LEVOFLOXACIN 5 MG/ML
500 INJECTION, SOLUTION INTRAVENOUS EVERY 24 HOURS
Status: DISCONTINUED | OUTPATIENT
Start: 2020-01-24 | End: 2020-01-24 | Stop reason: HOSPADM

## 2020-01-24 RX ORDER — MIDAZOLAM HYDROCHLORIDE 1 MG/ML
2 INJECTION INTRAMUSCULAR; INTRAVENOUS
Status: DISCONTINUED | OUTPATIENT
Start: 2020-01-24 | End: 2020-01-24 | Stop reason: HOSPADM

## 2020-01-24 RX ORDER — LIDOCAINE HYDROCHLORIDE 20 MG/ML
INJECTION, SOLUTION INFILTRATION; PERINEURAL AS NEEDED
Status: DISCONTINUED | OUTPATIENT
Start: 2020-01-24 | End: 2020-01-24 | Stop reason: SURG

## 2020-01-24 RX ORDER — FAMOTIDINE 10 MG/ML
20 INJECTION, SOLUTION INTRAVENOUS ONCE
Status: COMPLETED | OUTPATIENT
Start: 2020-01-24 | End: 2020-01-24

## 2020-01-24 RX ORDER — SODIUM CHLORIDE 0.9 % (FLUSH) 0.9 %
3-10 SYRINGE (ML) INJECTION AS NEEDED
Status: DISCONTINUED | OUTPATIENT
Start: 2020-01-24 | End: 2020-01-24 | Stop reason: HOSPADM

## 2020-01-24 RX ORDER — LIDOCAINE HYDROCHLORIDE 10 MG/ML
0.5 INJECTION, SOLUTION EPIDURAL; INFILTRATION; INTRACAUDAL; PERINEURAL ONCE AS NEEDED
Status: DISCONTINUED | OUTPATIENT
Start: 2020-01-24 | End: 2020-01-24 | Stop reason: HOSPADM

## 2020-01-24 RX ORDER — PROMETHAZINE HYDROCHLORIDE 25 MG/1
25 TABLET ORAL ONCE AS NEEDED
Status: DISCONTINUED | OUTPATIENT
Start: 2020-01-24 | End: 2020-01-24 | Stop reason: HOSPADM

## 2020-01-24 RX ORDER — ONDANSETRON 2 MG/ML
INJECTION INTRAMUSCULAR; INTRAVENOUS AS NEEDED
Status: DISCONTINUED | OUTPATIENT
Start: 2020-01-24 | End: 2020-01-24 | Stop reason: SURG

## 2020-01-24 RX ORDER — SODIUM CHLORIDE 0.9 % (FLUSH) 0.9 %
3 SYRINGE (ML) INJECTION EVERY 12 HOURS SCHEDULED
Status: DISCONTINUED | OUTPATIENT
Start: 2020-01-24 | End: 2020-01-24 | Stop reason: HOSPADM

## 2020-01-24 RX ADMIN — FAMOTIDINE 20 MG: 10 INJECTION INTRAVENOUS at 15:07

## 2020-01-24 RX ADMIN — LEVOFLOXACIN 500 MG: 5 INJECTION, SOLUTION INTRAVENOUS at 16:01

## 2020-01-24 RX ADMIN — PROPOFOL 200 MG: 10 INJECTION, EMULSION INTRAVENOUS at 16:49

## 2020-01-24 RX ADMIN — LIDOCAINE HYDROCHLORIDE 100 MG: 20 INJECTION, SOLUTION INFILTRATION; PERINEURAL at 16:48

## 2020-01-24 RX ADMIN — SODIUM CHLORIDE, POTASSIUM CHLORIDE, SODIUM LACTATE AND CALCIUM CHLORIDE 9 ML/HR: 600; 310; 30; 20 INJECTION, SOLUTION INTRAVENOUS at 15:07

## 2020-01-24 RX ADMIN — HYDROCODONE BITARTRATE AND ACETAMINOPHEN 2 TABLET: 5; 325 TABLET ORAL at 18:05

## 2020-01-24 RX ADMIN — ONDANSETRON HYDROCHLORIDE 4 MG: 2 SOLUTION INTRAMUSCULAR; INTRAVENOUS at 17:06

## 2020-01-24 RX ADMIN — MIDAZOLAM 1 MG: 1 INJECTION INTRAMUSCULAR; INTRAVENOUS at 16:06

## 2020-01-24 NOTE — ANESTHESIA PREPROCEDURE EVALUATION
Anesthesia Evaluation     Patient summary reviewed and Nursing notes reviewed   no history of anesthetic complications:  NPO Solid Status: > 8 hours  NPO Liquid Status: > 8 hours           Airway   Mallampati: II  Dental - normal exam     Pulmonary - normal exam   (+) sleep apnea,   Cardiovascular - normal exam    (+) hypertension less than 2 medications, hyperlipidemia,       Neuro/Psych  (+) psychiatric history Depression,     GI/Hepatic/Renal/Endo    (+) obesity,   renal disease stones, diabetes mellitus type 2,     Musculoskeletal     Abdominal    Substance History      OB/GYN          Other                        Anesthesia Plan    ASA 3     general     intravenous induction     Anesthetic plan, all risks, benefits, and alternatives have been provided, discussed and informed consent has been obtained with: patient.

## 2020-01-24 NOTE — H&P
FIRST UROLOGY CONSULT      Patient Identification:  NAME:  Angelito Hart  Age:  54 y.o.   Sex:  male   :  1965   MRN:  [id number]       Chief complaint:stones    History of present illness:  l  Renal stones s/p eswl       Past medical history:  Past Medical History:   Diagnosis Date   • Depression    • Diabetes mellitus (CMS/HCC)     TYPE 2   • Family history of premature CAD 2018   • Hypercalcemia    • Hyperlipidemia    • Hypertension    • Hypogonadism in male    • Increased PTH level    • Kidney stones    • Obesity    • Sleep apnea     uses cpap       Past surgical history:  Past Surgical History:   Procedure Laterality Date   • BRANCHIAL CLEFT CYST EXCISION     • COLONOSCOPY N/A 2017    Procedure: COLONOSCOPY INTO TERMINAL ILEUM WITH COLD BIOPSY POLYPECTOMIES;  Surgeon: Kelby Winter MD;  Location: [facility] ENDOSCOPY;  Service:    • CYSTOSCOPY      2007   • EXTRACORPOREAL SHOCK WAVE LITHOTRIPSY (ESWL) Left 9/15/2017    Procedure: EXTRACORPOREAL SHOCKWAVE LITHOTRIPSY WITH CYSTOSCOPY AND STENT PLACEMENT AND LEFT UTEROSCOPY;  Surgeon: Otilio Mcgrath MD;  Location: [facility] OR [facility] [place], [place];  Service:    • EXTRACORPOREAL SHOCK WAVE LITHOTRIPSY (ESWL) Left 10/11/2019    Procedure: EXTRACORPOREAL SHOCKWAVE LITHOTRIPSY LEFT;  Surgeon: Otilio Mcgrath MD;  Location: [facility] OR [facility] [place], [place];  Service: Urology   • EYE SURGERY     • TONSILLECTOMY         Allergies:  Penicillins and Sertraline hcl    Home medications:  Medications Prior to Admission   Medication Sig Dispense Refill Last Dose   • Empagliflozin (JARDIANCE) 25 MG tablet Take 25 mg by mouth Daily.   2020 at Unknown time   • Lancet Device misc    2020 at Unknown time   • lisinopril-hydrochlorothiazide (PRINZIDE,ZESTORETIC) 20-12.5 MG per tablet Take 1 tablet by mouth Daily.   2020 at Unknown time   • metFORMIN (GLUCOPHAGE) 1000 MG tablet Take 1,000 mg by mouth 2 (Two) Times a Day With Meals.   2020 at Unknown time   •  metoprolol succinate XL (TOPROL-XL) 50 MG 24 hr tablet Take 1 tablet by mouth Daily. (Patient taking differently: Take 50 mg by mouth Every Night.) 30 tablet 3 2020 at 2359   • simvastatin (ZOCOR) 40 MG tablet Take 40 mg by mouth Every Night.   2020 at Unknown time   • SITagliptin (JANUVIA) 100 MG tablet Take 100 mg by mouth Daily.   2020 at Unknown time   • aspirin 81 MG EC tablet Take 81 mg by mouth Daily. holding   2020   • vitamin D (ERGOCALCIFEROL) 89033 units capsule capsule Take 50,000 Units by mouth 1 (One) Time Per Week.   2020        Hospital medications:    levoFLOXacin 500 mg Intravenous Once   sodium chloride 3 mL Intravenous Q12H       lactated ringers 9 mL/hr Last Rate: 9 mL/hr (20 1507)     •  fentanyl  •  lidocaine PF 1%  •  midazolam **OR** midazolam  •  sodium chloride    Family history:  Family History   Problem Relation Age of Onset   • Diabetes Mother    • Heart attack Father 54   • Heart disease Father 54   • Diabetes Father    • Sudden death Father    • Heart failure Maternal Uncle    • Diabetes Maternal Grandmother    • Heart attack Paternal Grandfather         unknown   • Diabetes Son    • Malig Hyperthermia Neg Hx        Social history:  Social History     Tobacco Use   • Smoking status: Former Smoker     Packs/day: 0.00     Years: 21.00     Pack years: 0.00     Types: Cigarettes     Last attempt to quit: 10/27/2019     Years since quittin.2   • Smokeless tobacco: Never Used   Substance Use Topics   • Alcohol use: No     Comment: Caffeine use   • Drug use: No       Review of systems:      Positive for:    Negative for:      Objective:  TMax 24 hours:   Temp (24hrs), Av.6 °F (37 °C), Min:98.6 °F (37 °C), Max:98.6 °F (37 °C)      Vitals Ranges:   Temp:  [98.6 °F (37 °C)] 98.6 °F (37 °C)  Heart Rate:  [84] 84  Resp:  [18] 18  BP: (152)/(85) 152/85    Intake/Output Last 3 shifts:  No intake/output data recorded.     Physical Exam:    General Appearance:     Alert, cooperative, NAD   HEENT:    No trauma, pupils reactive, hearing intact   Back:     No CVA tenderness   Lungs:     Respirations unlabored, no wheezing       Abdomen:     Soft, NDNT, no masses, no guarding       Extremities:   No edema, no deformity   Lymphatic:   No neck or groin LAD   Skin:   No bleeding, bruising or rashes   Neuro/Psych:   Orientation intact, mood/affect pleasant, no focal findings       Results review:   I reviewed the patient's new clinical results.    Data review:  Lab Results (last 24 hours)     Procedure Component Value Units Date/Time    POC Glucose Once [[id number]]  (Abnormal) Collected:  01/24/20 1426    Specimen:  Blood Updated:  01/24/20 1428     Glucose 171 mg/dL            Imaging:  Imaging Results (Last 24 Hours)     ** No results found for the last 24 hours. **             Assessment:       * No active hospital problems. *    l renal stones    Plan:     Cysto l urs ll sbe stent  R/b/a explained bleeding infection ureteral obstruction injury staged further procedure anesthetic risks need for further procedures inability to accesss tones    Josse Garcia MD  01/24/20  3:56 PM

## 2020-01-24 NOTE — ANESTHESIA PROCEDURE NOTES
Airway  Urgency: elective    Date/Time: 1/24/2020 4:52 PM  Airway not difficult    General Information and Staff    Patient location during procedure: OR  Anesthesiologist: Sotero Mcmillan MD    Indications and Patient Condition  Indications for airway management: airway protection    Preoxygenated: yes  Mask difficulty assessment: 1 - vent by mask    Final Airway Details  Final airway type: supraglottic airway      Successful airway: LMA  Size 5    Number of attempts at approach: 1  Assessment: lips, teeth, and gum same as pre-op

## 2020-01-24 NOTE — OP NOTE
Operative Report    [facility] MAIN OR    Patient: Angelito Hart  Age:      54 y.o.  :     1965  Sex:      male    Medical Record:  [id number]    Date of Operation/Procedure:  2020    Pre-op Diagnosis: l renal stones    Post-Op Diagnosis Codes:   same    Pre-operative Diagnosis Free Text:  * No pre-op diagnosis entered *     Name of Operation/Procedure:  Procedure(s) and Anesthesia Type:     * CYSROSCOPY URETEROSCOPY LASER LITHOTRIPSY STONE BASKET EXTRACTION STENT WITH STRING - General    Findings/Complications:  none    Description of procedure: Pt was identified brought to OR having received IV Levaquin.  Following general anesthesia  Pt was placed in the dorsolithotomy position.  The genitals were prepped and draped in sterile fashion.  A cystoscope was introduced into the bladder, the left uo was engaged with a sensor wire and advanced uder fluoroscopy into the kidney.  Over the wire a access sheath was placed, and the wire and obturator were removed.  A flexible ureteroscope was advanced into the kidney, and holmium laser lithotripsy with a 200 micron fiber was used on lower p[ole stones.  The stones were basketed out without difficulty.  The remaining stones were quite small and too small to basket, we felt ameneable to spontaneous passage.  A wire was placed into the kidney, the sheath was removed.  Under fluoro a  stent with tether was advanced into the kidney without difficulty, and the tether was anchored t the glans with steristrips.    Estimated Blood Loss: min    Specimens:   Order Name Source Comment Collection Info Order Time   STONE ANALYSIS Ureter, Left  Collected By: Josse Garcia MD 2020  5:22 PM       Fluids/Drains:  stent left with tether    Josse Garcia MD  2020  5:23 PM

## 2020-01-25 NOTE — ANESTHESIA POSTPROCEDURE EVALUATION
"Patient: Angelito Hart    Procedure Summary     Date:  01/24/20 Room / Location:  [facility] OR 01 / [facility] MAIN OR    Anesthesia Start:  1644 Anesthesia Stop:  1739    Procedure:  CYSROSCOPY URETEROSCOPY LASER LITHOTRIPSY STONE BASKET EXTRACTION STENT WITH STRING (Left ) Diagnosis:      Surgeon:  Josse Garcia MD Provider:  Sotero Mcmillan MD    Anesthesia Type:  general ASA Status:  3          Anesthesia Type: general    Vitals  Vitals Value Taken Time   /85 1/24/2020  6:30 PM   Temp 36.6 °C (97.9 °F) 1/24/2020  6:30 PM   Pulse 86 1/24/2020  6:30 PM   Resp 18 1/24/2020  6:30 PM   SpO2 96 % 1/24/2020  6:34 PM   Vitals shown include unvalidated device data.        Post Anesthesia Care and Evaluation    Patient location during evaluation: bedside  Patient participation: complete - patient participated  Level of consciousness: awake and alert  Pain management: adequate  Airway patency: patent  Anesthetic complications: No anesthetic complications    Cardiovascular status: acceptable  Respiratory status: acceptable  Hydration status: acceptable    Comments: /88   Pulse (!) 47   Temp 36.6 °C (97.9 °F) (Oral)   Resp 18   Ht 180.3 cm (71\")   Wt 118 kg (260 lb 1 oz)   SpO2 96%   BMI 36.27 kg/m²       "

## 2020-02-05 LAB
CA PHOS CRY STONE QL IR: 5 %
COLOR STONE: NORMAL
COM CRY STONE QL IR: 95 %
COMPN STONE: NORMAL
CONV COMMENT: NORMAL
Lab: NORMAL
Lab: NORMAL
NIDUS STONE QL: NORMAL
PATH REPORT.COMMENTS IMP SPEC: NORMAL
SIZE STONE: NORMAL MM
WT STONE: 61.2 MG

## 2020-05-06 RX ORDER — METOPROLOL SUCCINATE 50 MG/1
TABLET, EXTENDED RELEASE ORAL
Qty: 30 TABLET | Refills: 2 | Status: SHIPPED | OUTPATIENT
Start: 2020-05-06 | End: 2020-08-18

## 2020-08-18 RX ORDER — METOPROLOL SUCCINATE 50 MG/1
TABLET, EXTENDED RELEASE ORAL
Qty: 30 TABLET | Refills: 1 | Status: SHIPPED | OUTPATIENT
Start: 2020-08-18 | End: 2020-10-19 | Stop reason: SDUPTHER

## 2020-10-19 NOTE — TELEPHONE ENCOUNTER
Pt called again to check in on medication refill. Please contact pt at [phone].  Thanks,  Tariq

## 2020-10-19 NOTE — TELEPHONE ENCOUNTER
Gregory Rivers would like to schedule an appointment with VICKIE. This is in regards to confirming the medication is treating the PVC's correctly. Please advise.   # [phone]  Thanks,  Tariq

## 2020-10-19 NOTE — TELEPHONE ENCOUNTER
----- Message from Angelito Hart sent at 10/19/2020 10:19 AM EDT -----  Regarding: Prescription Question  Contact: [phone]  Tone Skinner,    Today I left your assistant a voice mail requesting a new script for Metoprolol 50 MG be sent to my pharmacy of record [facility] [address] for 90 day refills instead of 30 day refills.   65.  Cell [phone]. Thanks much for your help.

## 2020-10-21 ENCOUNTER — TELEPHONE (OUTPATIENT)
Dept: CARDIOLOGY | Facility: CLINIC | Age: 55
End: 2020-10-21

## 2020-10-21 RX ORDER — METOPROLOL SUCCINATE 50 MG/1
50 TABLET, EXTENDED RELEASE ORAL DAILY
Qty: 90 TABLET | Refills: 0 | Status: SHIPPED | OUTPATIENT
Start: 2020-10-21 | End: 2020-11-22 | Stop reason: SDUPTHER

## 2020-10-21 NOTE — TELEPHONE ENCOUNTER
Please call patient to make an appointment to see Dr. Alvarado in the near future.  I have refilled his metoprolol for 90 days with 0 refills until he is seen in the office.  Thank you

## 2020-11-20 ENCOUNTER — TELEMEDICINE (OUTPATIENT)
Dept: CARDIOLOGY | Facility: CLINIC | Age: 55
End: 2020-11-20

## 2020-11-20 VITALS — BODY MASS INDEX: 36.4 KG/M2 | WEIGHT: 260 LBS | HEIGHT: 71 IN

## 2020-11-20 DIAGNOSIS — I10 ESSENTIAL HYPERTENSION: ICD-10-CM

## 2020-11-20 DIAGNOSIS — I49.3 PVC'S (PREMATURE VENTRICULAR CONTRACTIONS): Primary | ICD-10-CM

## 2020-11-20 DIAGNOSIS — G47.33 OBSTRUCTIVE SLEEP APNEA SYNDROME: ICD-10-CM

## 2020-11-20 PROCEDURE — 99214 OFFICE O/P EST MOD 30 MIN: CPT | Performed by: NURSE PRACTITIONER

## 2020-11-20 NOTE — PROGRESS NOTES
Telehealth Visit    Date of Visit: 2020  Encounter Provider: ERNESTINE Donnelly  Place of Service: [facility] CARDIOLOGY  Patient Name: Angelito Hart  :1965  Primary Cardiologist: Dr. Dr. Omar Alvarado        Chief Complaint   Patient presents with   • Follow-up   :     Dear Dr. Garcia,     HPI: Angelito Hart is a pleasant 55 y.o. male who is an established patient of our practice. Due to COVID-19 virus, I am conducting a telehealth visit via video with patient and he has consented to this visit today.      He has a known history of type 2 diabetes mellitus, hypertension, hyperlipidemia, obesity, and obstructive sleep apnea (compliant with CPAP machine).  He was a longtime cigarette smoker and quit smoking in .  He reports a strong family history of coronary artery disease with his grandfather before 50 years of age and his father  at age 54 with a history of CAD, CABG, diabetes, neuropathy.     In May 2018, he was having chest pain and was evaluated by Dr. Omar Alvarado. A treadmill stress test which was completed on 2018 which showed the following: normal EKG stress test, frequent PVCs that decreased with exercise and returned in recovery, and he exercised for 9 minutes.  He was asymptomatic for the PVCs and no further work-up was recommended.    In 2019, he followed up with me in the office and was doing well at that time. In 2020, he wore a Holter monitor which showed normal sinus rhythm with frequent PVCs accounting for 32% of his total heartbeats.     Today is his annual follow-up visit and we are conducting this via video. He denies chest pain, shortness of air, PND, orthopnea, cough, edema, palpitations, dizziness, syncope, or bleeding.  Both his blood pressure and heart rate are elevated today.    I reviewed his last blood work from 2020: CMP normal except for glucose of 163.  Total cholesterol 88, triglycerides 100,  HDL 24, and LDL 44.  TSH normal.  Hemoglobin and hematocrit normal.    Past Medical History:   Diagnosis Date   • Depression    • Diabetes mellitus (CMS/HCC)     TYPE 2   • Family history of premature CAD 2018   • Hypercalcemia    • Hyperlipidemia    • Hypertension    • Hypogonadism in male    • Increased PTH level    • Kidney stones    • Obesity    • Sleep apnea     uses cpap       Past Surgical History:   Procedure Laterality Date   • BRANCHIAL CLEFT CYST EXCISION     • COLONOSCOPY N/A 2017    Procedure: COLONOSCOPY INTO TERMINAL ILEUM WITH COLD BIOPSY POLYPECTOMIES;  Surgeon: Kelby Winter MD;  Location: [facility] ENDOSCOPY;  Service:    • CYSTOSCOPY      2007   • EXTRACORPOREAL SHOCK WAVE LITHOTRIPSY (ESWL) Left 9/15/2017    Procedure: EXTRACORPOREAL SHOCKWAVE LITHOTRIPSY WITH CYSTOSCOPY AND STENT PLACEMENT AND LEFT UTEROSCOPY;  Surgeon: Otilio Mcgrath MD;  Location: [facility] OR [facility] [place];  Service:    • EXTRACORPOREAL SHOCK WAVE LITHOTRIPSY (ESWL) Left 10/11/2019    Procedure: EXTRACORPOREAL SHOCKWAVE LITHOTRIPSY LEFT;  Surgeon: Otilio Mcgrath MD;  Location: [facility] OR [facility] [place];  Service: Urology   • EYE SURGERY     • TONSILLECTOMY     • URETEROSCOPY LASER LITHOTRIPSY WITH STENT INSERTION Left 2020    Procedure: CYSROSCOPY URETEROSCOPY LASER LITHOTRIPSY STONE BASKET EXTRACTION STENT WITH STRING;  Surgeon: Josse Garcia MD;  Location: [facility] OR;  Service: Urology       Social History     Socioeconomic History   • Marital status:      Spouse name: Not on file   • Number of children: Not on file   • Years of education: Not on file   • Highest education level: Not on file   Tobacco Use   • Smoking status: Former Smoker     Packs/day: 0.00     Years: 21.00     Pack years: 0.00     Types: Cigarettes     Quit date: 10/27/2019     Years since quittin.0   • Smokeless tobacco: Never Used   • Tobacco comment: caffeine- diet sodas 4 daily    Substance and Sexual Activity   • Alcohol use:  No     Comment: Caffeine use   • Drug use: No   • Sexual activity: Defer       Family History   Problem Relation Age of Onset   • Diabetes Mother    • Heart attack Father 54   • Heart disease Father 54   • Diabetes Father    • Sudden death Father    • Heart failure Maternal Uncle    • Diabetes Maternal Grandmother    • Heart attack Paternal Grandfather         unknown   • Diabetes Son    • Malig Hyperthermia Neg Hx        The following portion of the patient's history were reviewed and updated as appropriate: past medical history, past surgical history, past social history, past family history, allergies, current medications, and problem list.    Review of Systems   Constitution: Negative.   Cardiovascular: Negative.    Respiratory: Negative.    Hematologic/Lymphatic: Negative.    Neurological: Negative.        Allergies   Allergen Reactions   • Penicillins Other (See Comments)     AS CHILD   • Sertraline Hcl Other (See Comments)     skaking         Current Outpatient Medications:   •  aspirin 81 MG EC tablet, Take 81 mg by mouth Daily. holding, Disp: , Rfl:   •  Empagliflozin (JARDIANCE) 25 MG tablet, Take 25 mg by mouth Daily., Disp: , Rfl:   •  Lancet Device misc, , Disp: , Rfl:   •  lisinopril-hydrochlorothiazide (PRINZIDE,ZESTORETIC) 20-12.5 MG per tablet, Take 1 tablet by mouth Daily., Disp: , Rfl:   •  metFORMIN (GLUCOPHAGE) 1000 MG tablet, Take 1,000 mg by mouth 2 (Two) Times a Day With Meals., Disp: , Rfl:   •  metoprolol succinate XL (TOPROL-XL) 50 MG 24 hr tablet, Take 1 tablet by mouth Daily., Disp: 90 tablet, Rfl: 0  •  simvastatin (ZOCOR) 40 MG tablet, Take 40 mg by mouth Every Night., Disp: , Rfl:   •  SITagliptin (JANUVIA) 100 MG tablet, Take 100 mg by mouth Daily., Disp: , Rfl:   •  vitamin D (ERGOCALCIFEROL) 1.25 MG (15733 UT) capsule capsule, Take 50,000 Units by mouth 2 (Two) Times a Week., Disp: , Rfl:         Objective:     Vitals:    11/20/20 1031   Weight: 118 kg (260 lb)   Height: 180.3 cm  "(71\")     Body mass index is 36.26 kg/m².    Due to telehealth visit, there was no EKG, vitals, or weight performed in our office.  Vitals/Weight were reported by the patient and conducted at home.     PHYSICAL EXAM:    Vitals Reviewed  General Appearance: No acute distress, well developed and well nourished.   Eyes: Conjunctivae and lids: No erythema, swelling, or discharge. Sclerae anicteric.   HENT: Atraumatic, normocephalic. External eyes, ears, and nose normal. No hearing loss noted. Mucous membranes normal. Lips not cyanotic.   Neck: Symmetrical and no evidence of mass.  Respiratory: No signs of respiratory distress. Respiration rhythm and depth normal.   Musculoskeletal: Normal movement of extremities.  Skin: General appearance normal. No pallor, cyanosis, diaphoresis.   Psychiatric: Patient alert and oriented to person, place, and time. Speech and behavior appropriate. Normal mood and affect.        Assessment:       Diagnosis Plan   1. PVC's (premature ventricular contractions)     2. Essential hypertension     3. Obstructive sleep apnea syndrome            Plan:       1.  PVCs: He has a known history of asymptomatic, multifocal PVCs treated with beta-blocker therapy.  He denies any palpitations.    2.  Hypertension: Blood pressure well controlled.    3.  Obstructive Sleep Apnea: Followed by Dr. Peralta.    4.  I recommended follow-up with Dr. Alvarado in 1 year, unless otherwise needed sooner.      This patient has consented to a telehealth visit via video. The visit was scheduled as a video visit to comply with patient safety concerns in accordance with CDC recommendations.  All vitals recorded within this visit are reported by the patient.  I spent 25 minutes in total including but not limited to the 10 minutes spent in direct conversation with this patient.      As always, it has been a pleasure to participate in your patient's care. Thank you.       Sincerely,         ERNESTINE Macdonald        Dictated " utilizing Dragon dictation

## 2020-11-22 PROBLEM — E55.9 VITAMIN D DEFICIENCY: Status: ACTIVE | Noted: 2020-09-09

## 2020-11-22 PROBLEM — I49.3 PVC (PREMATURE VENTRICULAR CONTRACTION): Status: ACTIVE | Noted: 2020-01-13

## 2020-11-22 RX ORDER — METOPROLOL SUCCINATE 50 MG/1
50 TABLET, EXTENDED RELEASE ORAL DAILY
Qty: 90 TABLET | Refills: 3 | Status: SHIPPED | OUTPATIENT
Start: 2020-11-22 | End: 2022-01-18 | Stop reason: SDUPTHER

## 2020-11-22 RX ORDER — ERGOCALCIFEROL 1.25 MG/1
50000 CAPSULE ORAL
COMMUNITY
Start: 2020-09-14

## 2021-03-26 ENCOUNTER — BULK ORDERING (OUTPATIENT)
Dept: CASE MANAGEMENT | Facility: OTHER | Age: 56
End: 2021-03-26

## 2021-03-26 DIAGNOSIS — Z23 IMMUNIZATION DUE: ICD-10-CM

## 2021-11-30 ENCOUNTER — OFFICE VISIT (OUTPATIENT)
Dept: CARDIOLOGY | Facility: CLINIC | Age: 56
End: 2021-11-30

## 2021-11-30 VITALS
DIASTOLIC BLOOD PRESSURE: 80 MMHG | HEART RATE: 106 BPM | SYSTOLIC BLOOD PRESSURE: 126 MMHG | BODY MASS INDEX: 36.96 KG/M2 | HEIGHT: 71 IN | WEIGHT: 264 LBS

## 2021-11-30 DIAGNOSIS — Z82.49 FAMILY HISTORY OF PREMATURE CAD: Chronic | ICD-10-CM

## 2021-11-30 DIAGNOSIS — E11.9 CONTROLLED TYPE 2 DIABETES MELLITUS WITHOUT COMPLICATION, WITHOUT LONG-TERM CURRENT USE OF INSULIN (HCC): Chronic | ICD-10-CM

## 2021-11-30 DIAGNOSIS — I10 PRIMARY HYPERTENSION: Primary | Chronic | ICD-10-CM

## 2021-11-30 DIAGNOSIS — I49.3 FREQUENT PVCS: ICD-10-CM

## 2021-11-30 DIAGNOSIS — G47.33 OBSTRUCTIVE SLEEP APNEA SYNDROME: Chronic | ICD-10-CM

## 2021-11-30 PROCEDURE — 99214 OFFICE O/P EST MOD 30 MIN: CPT | Performed by: INTERNAL MEDICINE

## 2021-11-30 PROCEDURE — 93000 ELECTROCARDIOGRAM COMPLETE: CPT | Performed by: INTERNAL MEDICINE

## 2021-11-30 NOTE — PROGRESS NOTES
Subjective:     Encounter Date: 11/30/21        Patient ID: Angelito Hart is a 56 y.o. male.    Chief Complaint:  History of Present Illness    Dear Lynn,    This patient comes into the office today for f/u of his cardiac standpoint. He has a history of asymptomatic frequent PVCs and is on low does metoprolol.    He has been feeling fine without any cardiac issues. He denies any chest pain, pressure, tightness, squeezing, or heartburn.  He has not experienced any feeling of palpitations, tachycardia or heart racing and no presyncope or syncope.  There has not been any problems with dizziness or lightheadedness.  There has not been any orthopnea or PND, and no problems with lower extremity edema.  He denies any shortness of breath at rest or with activity and has not had any wheezing.  He  has continued to perform daily activities of living without any specific problem or change in the level of activity.    He used to smoke but stopped 25 months ago.    This patient has no known cardiac history.  This patient has no history of coronary artery disease, congestive heart failure, rheumatic fever, rheumatic heart disease, congenital heart disease or heart murmur.  This patient has never required invasive cardiovascular evaluation.    The following portions of the patient's history were reviewed and updated as appropriate: allergies, current medications, past family history, past medical history, past social history, past surgical history and problem list.    Past Medical History:   Diagnosis Date   • Depression    • Diabetes mellitus (HCC)     TYPE 2   • Family history of premature CAD 5/18/2018   • Hypercalcemia    • Hyperlipidemia    • Hypertension    • Hypogonadism in male    • Increased PTH level    • Kidney stones    • Obesity    • Sleep apnea     uses cpap       Past Surgical History:   Procedure Laterality Date   • BRANCHIAL CLEFT CYST EXCISION     • COLONOSCOPY N/A 5/26/2017    Procedure: COLONOSCOPY  "INTO TERMINAL ILEUM WITH COLD BIOPSY POLYPECTOMIES;  Surgeon: Kelby Winter MD;  Location: [facility] ENDOSCOPY;  Service:    • CYSTOSCOPY      2007   • EXTRACORPOREAL SHOCK WAVE LITHOTRIPSY (ESWL) Left 9/15/2017    Procedure: EXTRACORPOREAL SHOCKWAVE LITHOTRIPSY WITH CYSTOSCOPY AND STENT PLACEMENT AND LEFT UTEROSCOPY;  Surgeon: Otilio Mcgrath MD;  Location: [facility] OR [facility] [place];  Service:    • EXTRACORPOREAL SHOCK WAVE LITHOTRIPSY (ESWL) Left 10/11/2019    Procedure: EXTRACORPOREAL SHOCKWAVE LITHOTRIPSY LEFT;  Surgeon: Otilio Mcgrath MD;  Location: [facility] OR [facility] [place];  Service: Urology   • EYE SURGERY     • TONSILLECTOMY     • URETEROSCOPY LASER LITHOTRIPSY WITH STENT INSERTION Left 2020    Procedure: CYSROSCOPY URETEROSCOPY LASER LITHOTRIPSY STONE BASKET EXTRACTION STENT WITH STRING;  Surgeon: Josse Garcia MD;  Location: [facility];  Service: Urology       Social History     Socioeconomic History   • Marital status:    Tobacco Use   • Smoking status: Former Smoker     Packs/day: 0.00     Years: 21.00     Pack years: 0.00     Types: Cigarettes     Quit date: 10/27/2019     Years since quittin.0   • Smokeless tobacco: Never Used   • Tobacco comment: caffeine- diet sodas 4 daily    Substance and Sexual Activity   • Alcohol use: No     Comment: Caffeine use   • Drug use: No   • Sexual activity: Defer         ECG 12 Lead    Date/Time: 2021 10:29 AM  Performed by: Omar Alvarado III, MD  Authorized by: Omar Alvarado III, MD   Comparison: compared with previous ECG   Similar to previous ECG  Rhythm: sinus rhythm  Ectopy: unifocal PVCs  Rate: normal  Conduction: conduction normal  ST Segments: ST segments normal  T Waves: T waves normal  QRS axis: normal  Other: no other findings    Clinical impression: normal ECG               Objective:     Vitals:    21 0922   BP: 126/80   Pulse: 106   Weight: 120 kg (264 lb)   Height: 180.3 cm (71\")         Physical Exam  Constitutional:       " General: He is not in acute distress.     Appearance: He is well-developed. He is not diaphoretic.   HENT:      Head: Normocephalic and atraumatic.      Nose: Nose normal.   Eyes:      General:         Right eye: No discharge.         Left eye: No discharge.      Conjunctiva/sclera: Conjunctivae normal.      Pupils: Pupils are equal, round, and reactive to light.   Neck:      Thyroid: No thyromegaly.      Trachea: No tracheal deviation.   Cardiovascular:      Rate and Rhythm: Normal rate and regular rhythm.      Pulses: Normal pulses.      Heart sounds: Normal heart sounds, S1 normal and S2 normal. No S3 sounds.    Pulmonary:      Effort: Pulmonary effort is normal. No respiratory distress.      Breath sounds: Normal breath sounds. No stridor.   Chest:      Chest wall: No tenderness.   Abdominal:      General: Bowel sounds are normal. There is no distension.      Palpations: Abdomen is soft. There is no mass.      Tenderness: There is no abdominal tenderness. There is no guarding or rebound.   Musculoskeletal:         General: No tenderness or deformity. Normal range of motion.      Cervical back: Normal range of motion and neck supple.   Lymphadenopathy:      Cervical: No cervical adenopathy.   Skin:     General: Skin is warm and dry.      Findings: No erythema or rash.   Neurological:      Mental Status: He is alert and oriented to person, place, and time.      Deep Tendon Reflexes: Reflexes are normal and symmetric.   Psychiatric:         Thought Content: Thought content normal.         Lab Review:             Results for orders placed during the hospital encounter of 01/03/20    Adult Transthoracic Echo Complete W/ Cont if Necessary Per Protocol    Interpretation Summary  · Left ventricular systolic function is normal. Calculated EF = 59%. Estimated EF was in agreement with the calculated EF. Estimated EF = 59%. Normal left ventricular cavity size and wall thickness noted. All left ventricular wall segments  contract normally. Left ventricular diastolic function is normal.  · Trace mitral valve regurgitation is present.  · Trace tricuspid valve regurgitation is present. Insufficient TR velocity profile to estimate the right ventricular systolic pressure.    Holter 1/2020  Interpretation Summary    · Borderline abnormal study  · PVCs, 32.3% of the total.           Assessment:          Diagnosis Plan   1. Primary hypertension     2. Frequent PVCs     3. Controlled type 2 diabetes mellitus without complication, without long-term current use of insulin (HCC)     4. Family history of premature CAD     5. Obstructive sleep apnea syndrome            Plan:       1. Asymptomatic PVCs, doing well, cont metoprolol  2.Essential HTN- continue current medical therapy.  3. History of tobacco abuse-no cigarettes for 25 months  4.  Obesity-focus on risk factor modification.  We provided information on this  5.  Family history of premature CAD-continue to focus on risk factor modification  6.  Diabetes mellitus without known complication, continue to focus on risk factor modification  7.  Mixed hyperlipidemia-continue lipid-lowering therapy    Thank you very much for allowing us to participate in the care of this pleasant patient.  Please don't hesitate to call if I can be of assistance in any way.      Current Outpatient Medications:   •  aspirin 81 MG EC tablet, Take 81 mg by mouth Daily. holding, Disp: , Rfl:   •  Empagliflozin (JARDIANCE) 25 MG tablet, Take 25 mg by mouth Daily., Disp: , Rfl:   •  Lancet Device misc, , Disp: , Rfl:   •  lisinopril-hydrochlorothiazide (PRINZIDE,ZESTORETIC) 20-12.5 MG per tablet, Take 1 tablet by mouth Daily., Disp: , Rfl:   •  metFORMIN (GLUCOPHAGE) 1000 MG tablet, Take 1,000 mg by mouth 2 (Two) Times a Day With Meals., Disp: , Rfl:   •  metoprolol succinate XL (TOPROL-XL) 50 MG 24 hr tablet, Take 1 tablet by mouth Daily., Disp: 90 tablet, Rfl: 3  •  Semaglutide 7 MG tablet, Take 7 mg by mouth Daily.,  Disp: , Rfl:   •  simvastatin (ZOCOR) 40 MG tablet, Take 40 mg by mouth Every Night., Disp: , Rfl:   •  SITagliptin (JANUVIA) 100 MG tablet, Take 100 mg by mouth Daily., Disp: , Rfl:   •  vitamin D (ERGOCALCIFEROL) 1.25 MG (30774 UT) capsule capsule, Take 50,000 Units by mouth Every 7 (Seven) Days., Disp: , Rfl:

## 2021-12-11 ENCOUNTER — IMMUNIZATION (OUTPATIENT)
Dept: VACCINE CLINIC | Facility: HOSPITAL | Age: 56
End: 2021-12-11

## 2021-12-11 PROCEDURE — 0003A: CPT | Performed by: INTERNAL MEDICINE

## 2021-12-11 PROCEDURE — 91300 HC SARSCOV02 VAC 30MCG/0.3ML IM: CPT | Performed by: INTERNAL MEDICINE

## 2021-12-11 PROCEDURE — 0004A HC ADM SARSCOV2 30MCG/0.3ML BOOSTER: CPT | Performed by: INTERNAL MEDICINE

## 2022-01-18 RX ORDER — METOPROLOL SUCCINATE 50 MG/1
50 TABLET, EXTENDED RELEASE ORAL DAILY
Qty: 90 TABLET | Refills: 3 | Status: SHIPPED | OUTPATIENT
Start: 2022-01-18 | End: 2022-12-09 | Stop reason: SDUPTHER

## 2022-12-09 ENCOUNTER — OFFICE VISIT (OUTPATIENT)
Dept: CARDIOLOGY | Facility: CLINIC | Age: 57
End: 2022-12-09

## 2022-12-09 VITALS
WEIGHT: 265 LBS | HEIGHT: 71 IN | BODY MASS INDEX: 37.1 KG/M2 | DIASTOLIC BLOOD PRESSURE: 82 MMHG | SYSTOLIC BLOOD PRESSURE: 126 MMHG | HEART RATE: 92 BPM

## 2022-12-09 DIAGNOSIS — E78.2 MIXED HYPERLIPIDEMIA: Chronic | ICD-10-CM

## 2022-12-09 DIAGNOSIS — I10 PRIMARY HYPERTENSION: Chronic | ICD-10-CM

## 2022-12-09 DIAGNOSIS — Z82.49 FAMILY HISTORY OF PREMATURE CAD: Chronic | ICD-10-CM

## 2022-12-09 DIAGNOSIS — I49.3 FREQUENT PVCS: Primary | Chronic | ICD-10-CM

## 2022-12-09 PROCEDURE — 99214 OFFICE O/P EST MOD 30 MIN: CPT | Performed by: INTERNAL MEDICINE

## 2022-12-09 PROCEDURE — 93000 ELECTROCARDIOGRAM COMPLETE: CPT | Performed by: INTERNAL MEDICINE

## 2022-12-09 RX ORDER — GLIMEPIRIDE 2 MG/1
2 TABLET ORAL DAILY
COMMUNITY
Start: 2022-09-26

## 2022-12-09 RX ORDER — METOPROLOL SUCCINATE 50 MG/1
50 TABLET, EXTENDED RELEASE ORAL DAILY
Qty: 90 TABLET | Refills: 3 | Status: SHIPPED | OUTPATIENT
Start: 2022-12-09

## 2022-12-09 RX ORDER — ICOSAPENT ETHYL 1000 MG/1
2 CAPSULE ORAL 2 TIMES DAILY
COMMUNITY

## 2022-12-09 NOTE — PROGRESS NOTES
Subjective:     Encounter Date: 12/09/22        Patient ID: Angelito Hart is a 57 y.o. male.    Chief Complaint:  History of Present Illness    Dear Lynn,    This patient comes into the office today for f/u of his cardiac standpoint. He has a history of asymptomatic frequent PVCs and is on low does metoprolol.    He feels great with no symptoms at all. He denies any chest pain, pressure, tightness, squeezing, or heartburn.  He has not experienced any feeling of palpitations, tachycardia or heart racing and no presyncope or syncope.  There has not been any problems with dizziness or lightheadedness.  There has not been any orthopnea or PND, and no problems with lower extremity edema.  He denies any shortness of breath at rest or with activity and has not had any wheezing.  He  has continued to perform daily activities of living without any specific problem or change in the level of activity.    He used to smoke but stopped 2019.    This patient has no known cardiac history.  This patient has no history of coronary artery disease, congestive heart failure, rheumatic fever, rheumatic heart disease, congenital heart disease or heart murmur.  This patient has never required invasive cardiovascular evaluation.    The following portions of the patient's history were reviewed and updated as appropriate: allergies, current medications, past family history, past medical history, past social history, past surgical history and problem list.    Past Medical History:   Diagnosis Date   • Abnormal ECG July 2017    PCP ARNP in-office EKG, irregular beat, not concerned   • Arrhythmia July 2017    PCP ARNP in-office EKG, irregular beat, not concerned   • Depression    • Diabetes mellitus (HCC)     TYPE 2   • Family history of premature CAD 05/18/2018   • Hypercalcemia    • Hyperlipidemia    • Hypertension    • Hypogonadism in male    • Increased PTH level    • Kidney stones    • Obesity    • Sleep apnea     uses cpap        Past Surgical History:   Procedure Laterality Date   • BRANCHIAL CLEFT CYST EXCISION     • COLONOSCOPY N/A 05/26/2017    Procedure: COLONOSCOPY INTO TERMINAL ILEUM WITH COLD BIOPSY POLYPECTOMIES;  Surgeon: Kelby Winter MD;  Location: [facility] ENDOSCOPY;  Service:    • CYSTOSCOPY      2007   • EXTRACORPOREAL SHOCK WAVE LITHOTRIPSY (ESWL) Left 09/15/2017    Procedure: EXTRACORPOREAL SHOCKWAVE LITHOTRIPSY WITH CYSTOSCOPY AND STENT PLACEMENT AND LEFT UTEROSCOPY;  Surgeon: Otilio Mcgrath MD;  Location: [facility] OR [facility];  Service:    • EXTRACORPOREAL SHOCK WAVE LITHOTRIPSY (ESWL) Left 10/11/2019    Procedure: EXTRACORPOREAL SHOCKWAVE LITHOTRIPSY LEFT;  Surgeon: Otilio Mcgrath MD;  Location: [facility] OR [facility];  Service: Urology   • EYE SURGERY     • TONSILLECTOMY     • URETEROSCOPY LASER LITHOTRIPSY WITH STENT INSERTION Left 01/24/2020    Procedure: CYSROSCOPY URETEROSCOPY LASER LITHOTRIPSY STONE BASKET EXTRACTION STENT WITH STRING;  Surgeon: Josse Garica MD;  Location: [facility] OR;  Service: Urology       Social History     Socioeconomic History   • Marital status:    Tobacco Use   • Smoking status: Former     Packs/day: 0.50     Years: 21.00     Pack years: 10.50     Types: Cigarettes     Start date: 4/30/1998     Quit date: 10/27/2019     Years since quitting: 3.1   • Smokeless tobacco: Never   • Tobacco comments:     after 21 years, I quit on 10/27/19   Substance and Sexual Activity   • Alcohol use: No     Comment: Caffeine use   • Drug use: No   • Sexual activity: Not Currently     Partners: Female     Birth control/protection: Abstinence         ECG 12 Lead    Date/Time: 12/9/2022 11:28 AM  Performed by: Omar Alvarado III, MD  Authorized by: Omar Alvarado III, MD   Comparison: compared with previous ECG   Similar to previous ECG  Rhythm: sinus rhythm  Rate: normal  Conduction: conduction normal  ST Segments: ST segments normal  T Waves: T waves normal  QRS axis: normal  Other findings:  "poor R wave progression    Clinical impression: normal ECG               Objective:     Vitals:    12/09/22 1107   BP: 126/82   Pulse: 92   Weight: 120 kg (265 lb)   Height: 180.3 cm (71\")         Physical Exam  Constitutional:       General: He is not in acute distress.     Appearance: He is well-developed. He is not diaphoretic.   HENT:      Head: Normocephalic and atraumatic.      Nose: Nose normal.   Eyes:      General:         Right eye: No discharge.         Left eye: No discharge.      Conjunctiva/sclera: Conjunctivae normal.      Pupils: Pupils are equal, round, and reactive to light.   Neck:      Thyroid: No thyromegaly.      Trachea: No tracheal deviation.   Cardiovascular:      Rate and Rhythm: Normal rate and regular rhythm.      Pulses: Normal pulses.      Heart sounds: Normal heart sounds, S1 normal and S2 normal.     No S3 sounds.   Pulmonary:      Effort: Pulmonary effort is normal. No respiratory distress.      Breath sounds: Normal breath sounds. No stridor.   Chest:      Chest wall: No tenderness.   Abdominal:      General: Bowel sounds are normal. There is no distension.      Palpations: Abdomen is soft. There is no mass.      Tenderness: There is no abdominal tenderness. There is no guarding or rebound.   Musculoskeletal:         General: No tenderness or deformity. Normal range of motion.      Cervical back: Normal range of motion and neck supple.   Lymphadenopathy:      Cervical: No cervical adenopathy.   Skin:     General: Skin is warm and dry.      Findings: No erythema or rash.   Neurological:      Mental Status: He is alert and oriented to person, place, and time.      Deep Tendon Reflexes: Reflexes are normal and symmetric.   Psychiatric:         Thought Content: Thought content normal.         Lab Review:             Results for orders placed during the hospital encounter of 01/03/20    Adult Transthoracic Echo Complete W/ Cont if Necessary Per Protocol    Interpretation Summary  · Left " ventricular systolic function is normal. Calculated EF = 59%. Estimated EF was in agreement with the calculated EF. Estimated EF = 59%. Normal left ventricular cavity size and wall thickness noted. All left ventricular wall segments contract normally. Left ventricular diastolic function is normal.  · Trace mitral valve regurgitation is present.  · Trace tricuspid valve regurgitation is present. Insufficient TR velocity profile to estimate the right ventricular systolic pressure.    Holter 1/2020  Interpretation Summary    · Borderline abnormal study  · PVCs, 32.3% of the total.           Assessment:          Diagnosis Plan   1. Frequent PVCs  ECG 12 Lead      2. Mixed hyperlipidemia  ECG 12 Lead      3. Primary hypertension  ECG 12 Lead      4. Family history of premature CAD  ECG 12 Lead             Plan:       1. Asymptomatic PVCs, doing well, cont metoprolol  2.Essential HTN- continue current medical therapy.  3. History of tobacco abuse-stopped 2019  4.  Obesity-focus on risk factor modification.    5.  Family history of premature CAD-continue to focus on risk factor modification  6.  Diabetes mellitus without known complication, continue to focus on risk factor modification  7.  Mixed hyperlipidemia-continue lipid-lowering therapy  8. To see sleep medicine soon for SOHAM/CPAP    No active cardiac issues, can follow-up with us on an as-needed basis.    Thank you very much for allowing us to participate in the care of this pleasant patient.  Please don't hesitate to call if I can be of assistance in any way.      Current Outpatient Medications:   •  aspirin 81 MG EC tablet, Take 81 mg by mouth Daily. holding, Disp: , Rfl:   •  Empagliflozin (JARDIANCE) 25 MG tablet, Take 25 mg by mouth Daily., Disp: , Rfl:   •  glimepiride (AMARYL) 2 MG tablet, Take 2 mg by mouth Daily., Disp: , Rfl:   •  icosapent ethyl (VASCEPA) 1 g capsule capsule, Take 2 capsules by mouth 2 (Two) Times a Day., Disp: , Rfl:   •  Lancet Device  misc, , Disp: , Rfl:   •  lisinopril-hydrochlorothiazide (PRINZIDE,ZESTORETIC) 20-12.5 MG per tablet, Take 1 tablet by mouth Daily., Disp: , Rfl:   •  metFORMIN (GLUCOPHAGE) 1000 MG tablet, Take 1,000 mg by mouth 2 (Two) Times a Day With Meals., Disp: , Rfl:   •  metoprolol succinate XL (TOPROL-XL) 50 MG 24 hr tablet, Take 1 tablet by mouth Daily., Disp: 90 tablet, Rfl: 3  •  Semaglutide 14 MG tablet, Take 1 tablet by mouth Daily., Disp: , Rfl:   •  simvastatin (ZOCOR) 40 MG tablet, Take 40 mg by mouth Every Night., Disp: , Rfl:   •  SITagliptin (JANUVIA) 100 MG tablet, Take 100 mg by mouth Daily., Disp: , Rfl:   •  vitamin D (ERGOCALCIFEROL) 1.25 MG (42571 UT) capsule capsule, Take 50,000 Units by mouth Every 7 (Seven) Days., Disp: , Rfl:

## 2022-12-19 ENCOUNTER — OFFICE VISIT (OUTPATIENT)
Dept: SLEEP MEDICINE | Facility: HOSPITAL | Age: 57
End: 2022-12-19

## 2022-12-19 VITALS
HEIGHT: 71 IN | DIASTOLIC BLOOD PRESSURE: 84 MMHG | HEART RATE: 85 BPM | WEIGHT: 262.2 LBS | OXYGEN SATURATION: 96 % | SYSTOLIC BLOOD PRESSURE: 147 MMHG | BODY MASS INDEX: 36.71 KG/M2

## 2022-12-19 DIAGNOSIS — G47.33 OSA (OBSTRUCTIVE SLEEP APNEA): Primary | ICD-10-CM

## 2022-12-19 PROCEDURE — G0463 HOSPITAL OUTPT CLINIC VISIT: HCPCS

## 2022-12-19 NOTE — PROGRESS NOTES
CONSULT NOTE    Patient Identification:  Angelito Hubbard y.o.  male  1965  [id number]            Requesting physician: Lynn Watson    Reason for Consultation: SOHAM establish care    CC:     History of Present Illness:  Very pleasant 57-year-old gentleman he was actually being seen by Dr. Peralta and was diagnosed with moderate SOHAM AHI 14.5 events per hour date of the study I have is from 2012.  He was prescribed a CPAP 12 cm.  He is currently compliant with the CPAP 12 with compliance of 100% average daily use 7 hours 18 minutes AHI and leak look normal.  Reports feeling rested with the use of current CPAP.  No heavy alcohol use reported.  No parasomnias such as sleepwalking sleep talking or restless leg symptoms reported.  His weight has been stable.  Goes to bed 1030 gets of 6 gets about 7+ hours of sleep and generally feels rested.  Occasional morning headache reported.      Review of Systems  Positive for frequent urination irregular heartbeat always feeling too warm.  Rest of the 12 point review of system negative.  Past Medical History:  Past Medical History:   Diagnosis Date   • Abnormal ECG July 2017    PCP ARNP in-office EKG, irregular beat, not concerned   • Arrhythmia July 2017    PCP ARNP in-office EKG, irregular beat, not concerned   • Depression    • Diabetes mellitus (HCC)     TYPE 2   • Family history of premature CAD 05/18/2018   • Hypercalcemia    • Hyperlipidemia    • Hypertension    • Hypogonadism in male    • Increased PTH level    • Kidney stones    • Obesity    • Sleep apnea     uses cpap       Past Surgical History:  Past Surgical History:   Procedure Laterality Date   • BRANCHIAL CLEFT CYST EXCISION     • COLONOSCOPY N/A 05/26/2017    Procedure: COLONOSCOPY INTO TERMINAL ILEUM WITH COLD BIOPSY POLYPECTOMIES;  Surgeon: Kelby Winter MD;  Location: [facility] ENDOSCOPY;  Service:    • CYSTOSCOPY      2007   • EXTRACORPOREAL SHOCK WAVE LITHOTRIPSY (ESWL) Left 09/15/2017     Procedure: EXTRACORPOREAL SHOCKWAVE LITHOTRIPSY WITH CYSTOSCOPY AND STENT PLACEMENT AND LEFT UTEROSCOPY;  Surgeon: Otilio Mcgrath MD;  Location: [facility] OR [facility];  Service:    • EXTRACORPOREAL SHOCK WAVE LITHOTRIPSY (ESWL) Left 10/11/2019    Procedure: EXTRACORPOREAL SHOCKWAVE LITHOTRIPSY LEFT;  Surgeon: Otilio Mcgrath MD;  Location: [facility];  Service: Urology   • EYE SURGERY     • TONSILLECTOMY     • URETEROSCOPY LASER LITHOTRIPSY WITH STENT INSERTION Left 2020    Procedure: CYSROSCOPY URETEROSCOPY LASER LITHOTRIPSY STONE BASKET EXTRACTION STENT WITH STRING;  Surgeon: Josse Garcia MD;  Location: [facility] OR;  Service: Urology        Home Meds:  (Not in a hospital admission)      Allergies:  Allergies   Allergen Reactions   • Penicillins Other (See Comments)     AS CHILD   • Sertraline Hcl Other (See Comments)     skaking       Social History:   Social History     Socioeconomic History   • Marital status:    Tobacco Use   • Smoking status: Former     Packs/day: 0.50     Years: 21.00     Pack years: 10.50     Types: Cigarettes     Start date: 1998     Quit date: 10/27/2019     Years since quitting: 3.1   • Smokeless tobacco: Never   • Tobacco comments:     after 21 years, I quit on 10/27/19   Substance and Sexual Activity   • Alcohol use: No     Comment: Caffeine use   • Drug use: No   • Sexual activity: Not Currently     Partners: Female     Birth control/protection: Abstinence       Family History:  Family History   Problem Relation Age of Onset   • Diabetes Mother    • Arrhythmia Mother         got pacemaker 2 years ago to regulate or something like that - living, about age 80   • Heart attack Father         quadruple bypass, diabetic neuropathy, amputations,  age 54   • Heart disease Father         quadruple bypass   • Diabetes Father    • Sudden death Father    • Heart failure Maternal Uncle    • Diabetes Maternal Grandmother    • Heart attack Paternal Grandfather          death from heart attack when my father was a child   • Diabetes Son    • Heart failure Maternal Uncle          age 64   • Malig Hyperthermia Neg Hx        Physical Exam:  There were no vitals taken for this visit. There is no height or weight on file to calculate BMI.      Physical Exam  Patient is examined using the personal protective equipment as per guidelines from infection control for this particular patient as enacted.  Hand hygiene was performed before and after patient interaction.  Well-developed normal body habitus  Eyes normal conjunctivae and pupils reactive to light  ENT Mallampati between 3 and 4 normal nasal exam  Neck midline trachea no thyromegaly  Chest no labored breathing clear  CVS regular rate and rhythm no lower extremity edema  Abdomen soft nontender no hepatosplenomegaly  CNS intact normal sensory exam  Skin no rashes no nodules  Psych oriented to time place and person normal memory  Musculoskeletal no cyanosis no clubbing normal range of motion        LABS:  Lab Results   Component Value Date    CALCIUM 10.0 06/15/2022       No results found for: CKTOTAL, CKMB, CKMBINDEX, TROPONINI, TROPONINT                              Lab Results   Component Value Date    TSH 1.030 06/15/2022     CrCl cannot be calculated (Patient's most recent lab result is older than the maximum 30 days allowed.).         Imaging: I personally visualized the images of scans/x-rays performed within last 3 days.      Assessment:  SOHAM on CPAP  Hypertension  Diabetes mellitus  Obesity  PVCs        Recommendations:  At this time we have a gentleman with known history of established moderate SOHAM AHI 14.5 events per hour we will go ahead and replace current CPAP which is more than 5 years old.  Will order new CPAP 12 cm  Reeducated patient on SOHAM and sleep hygiene measures  Norris recall information discussed with the patient.  He is already registered his CPAP machine.  Compliance download reviewed on the current  CPAP.  Compliance AHI leak look excellent.  Currently SOHAM is adequately treated  Weight loss encouraged  Sleep hygiene measures  We will see him back in 8 to 10 weeks to review download from the new CPAP machine              Shirley Chery MD  12/19/2022  14:57 EST      Much of this encounter note is an electronic transcription/translation of spoken language to printed text using Dragon Software.

## 2023-01-16 ENCOUNTER — TELEPHONE (OUTPATIENT)
Dept: SLEEP MEDICINE | Facility: HOSPITAL | Age: 58
End: 2023-01-16
Payer: COMMERCIAL

## 2023-01-16 NOTE — TELEPHONE ENCOUNTER
Faxed order for new [employer] device to [facility].  Order for [facility] replacement device was faxed to [facility].    Conf #:  [id number]  SN:  [id number]

## 2023-02-06 ENCOUNTER — TELEPHONE (OUTPATIENT)
Dept: SLEEP MEDICINE | Facility: HOSPITAL | Age: 58
End: 2023-02-06
Payer: COMMERCIAL

## 2023-03-14 ENCOUNTER — PRE-ADMISSION TESTING (OUTPATIENT)
Dept: PREADMISSION TESTING | Facility: HOSPITAL | Age: 58
End: 2023-03-14
Payer: COMMERCIAL

## 2023-03-14 VITALS
RESPIRATION RATE: 16 BRPM | DIASTOLIC BLOOD PRESSURE: 88 MMHG | SYSTOLIC BLOOD PRESSURE: 130 MMHG | HEIGHT: 71 IN | BODY MASS INDEX: 36.76 KG/M2 | TEMPERATURE: 98.2 F | OXYGEN SATURATION: 95 % | WEIGHT: 262.6 LBS | HEART RATE: 84 BPM

## 2023-03-14 LAB
ANION GAP SERPL CALCULATED.3IONS-SCNC: 13.6 MMOL/L (ref 5–15)
BUN SERPL-MCNC: 10 MG/DL (ref 6–20)
BUN/CREAT SERPL: 9.8 (ref 7–25)
CALCIUM SPEC-SCNC: 10.7 MG/DL (ref 8.6–10.5)
CHLORIDE SERPL-SCNC: 98 MMOL/L (ref 98–107)
CO2 SERPL-SCNC: 25.4 MMOL/L (ref 22–29)
CREAT SERPL-MCNC: 1.02 MG/DL (ref 0.76–1.27)
DEPRECATED RDW RBC AUTO: 38 FL (ref 37–54)
EGFRCR SERPLBLD CKD-EPI 2021: 85.7 ML/MIN/1.73
ERYTHROCYTE [DISTWIDTH] IN BLOOD BY AUTOMATED COUNT: 12.3 % (ref 12.3–15.4)
GLUCOSE SERPL-MCNC: 276 MG/DL (ref 65–99)
HCT VFR BLD AUTO: 47.7 % (ref 37.5–51)
HGB BLD-MCNC: 16.8 G/DL (ref 13–17.7)
MCH RBC QN AUTO: 30.2 PG (ref 26.6–33)
MCHC RBC AUTO-ENTMCNC: 35.2 G/DL (ref 31.5–35.7)
MCV RBC AUTO: 85.6 FL (ref 79–97)
PLATELET # BLD AUTO: 258 10*3/MM3 (ref 140–450)
PMV BLD AUTO: 9.6 FL (ref 6–12)
POTASSIUM SERPL-SCNC: 4 MMOL/L (ref 3.5–5.2)
RBC # BLD AUTO: 5.57 10*6/MM3 (ref 4.14–5.8)
SODIUM SERPL-SCNC: 137 MMOL/L (ref 136–145)
WBC NRBC COR # BLD: 8.02 10*3/MM3 (ref 3.4–10.8)

## 2023-03-14 PROCEDURE — 36415 COLL VENOUS BLD VENIPUNCTURE: CPT

## 2023-03-14 PROCEDURE — 80048 BASIC METABOLIC PNL TOTAL CA: CPT

## 2023-03-14 PROCEDURE — 85027 COMPLETE CBC AUTOMATED: CPT

## 2023-03-14 RX ORDER — ATORVASTATIN CALCIUM 40 MG/1
TABLET, FILM COATED ORAL
COMMUNITY
Start: 2023-03-13

## 2023-03-14 NOTE — DISCHARGE INSTRUCTIONS
Take the following medications the morning of surgery with a small sip of water:  METOPROLOL    ARRIVAL TIME: 730AM       If you are on prescription narcotic pain medication to control your pain you may also take that medication the morning of surgery.    General Instructions:  Do not eat or drink anything after midnight the night before surgery.  Patients who avoid smoking, chewing tobacco and alcohol for 4 weeks prior to surgery have a reduced risk of post-operative complications.  Quit smoking as many days before surgery as you can.  Do not smoke, use chewing tobacco or drink alcohol the day of surgery.   If applicable bring your C-PAP/ BI-PAP machine.  Bring any papers given to you in the doctor’s office.  Wear clean comfortable clothes.  Do not wear contact lenses, false eyelashes or make-up.  Bring a case for your glasses.   Bring crutches or walker if applicable.  Remove all piercings.  Leave jewelry and any other valuables at home.  Hair extensions with metal clips must be removed prior to surgery.  The Pre-Admission Testing nurse will instruct you to bring medications if unable to obtain an accurate list in Pre-Admission Testing.          Preventing a Surgical Site Infection:  For 2 to 3 days before surgery, avoid shaving with a razor because the razor can irritate skin and make it easier to develop an infection.    Any areas of open skin can increase the risk of a post-operative wound infection by allowing bacteria to enter and travel throughout the body.  Notify your surgeon if you have any skin wounds / rashes even if it is not near the expected surgical site.  The area will need assessed to determine if surgery should be delayed until it is healed.  The night prior to surgery shower using a fresh bar of anti-bacterial soap (such as Dial) and clean washcloth.  Sleep in a clean bed with clean clothing.  Do not allow pets to sleep with you.  Shower on the morning of surgery using a fresh bar of  anti-bacterial soap (such as Dial) and clean washcloth.  Dry with a clean towel and dress in clean clothing.  Ask your surgeon if you will be receiving antibiotics prior to surgery.  Make sure you, your family, and all healthcare providers clean their hands with soap and water or an alcohol based hand  before caring for you or your wound.    Day of surgery:  Your arrival time is approximately two hours before your scheduled surgery time.  Upon arrival, a Pre-op nurse and Anesthesiologist will review your health history, obtain vital signs, and answer questions you may have.  The only belongings needed at this time will be your home medications and if applicable your C-PAP/BI-PAP machine.  A Pre-op nurse will start an IV and you may receive medication in preparation for surgery, including something to help you relax.      Please be aware that surgery does come with discomfort.  We want to make every effort to control your discomfort so please discuss any uncontrolled symptoms with your nurse.   Your doctor will most likely have prescribed pain medications.      If you are going home after surgery you will receive individualized written care instructions before being discharged.  A responsible adult must drive you to and from the hospital on the day of your surgery and stay with you for 24 hours.  Discharge prescriptions can be filled by the hospital pharmacy during regular pharmacy hours.  If you are having surgery late in the day/evening your prescription may be e-prescribed to your pharmacy.  Please verify your pharmacy hours or chose a 24 hour pharmacy to avoid not having access to your prescription because your pharmacy has closed for the day.    If you are staying overnight following surgery, you will be transported to your hospital room following the recovery period.  [facility] has all private rooms.    If you have any questions please call Pre-Admission Testing at  [phone].  Deductibles and co-payments are collected on the day of service. Please be prepared to pay the required co-pay, deductible or deposit on the day of service as defined by your plan.    Call your surgeon immediately if you experience any of the following symptoms:  Sore Throat  Shortness of Breath or difficulty breathing  Cough  Chills  Body soreness or muscle pain  Headache  Fever  New loss of taste or smell  Do not arrive for your surgery ill.  Your procedure will need to be rescheduled to another time.  You will need to call your physician before the day of surgery to avoid any unnecessary exposure to hospital staff as well as other patients.

## 2023-03-17 ENCOUNTER — ANESTHESIA (OUTPATIENT)
Dept: PERIOP | Facility: HOSPITAL | Age: 58
End: 2023-03-17
Payer: COMMERCIAL

## 2023-03-17 ENCOUNTER — ANESTHESIA EVENT (OUTPATIENT)
Dept: PERIOP | Facility: HOSPITAL | Age: 58
End: 2023-03-17
Payer: COMMERCIAL

## 2023-03-17 ENCOUNTER — HOSPITAL ENCOUNTER (OUTPATIENT)
Facility: HOSPITAL | Age: 58
Setting detail: HOSPITAL OUTPATIENT SURGERY
Discharge: HOME | End: 2023-03-17
Attending: SURGERY | Admitting: SURGERY
Payer: COMMERCIAL

## 2023-03-17 VITALS
BODY MASS INDEX: 37.68 KG/M2 | SYSTOLIC BLOOD PRESSURE: 119 MMHG | OXYGEN SATURATION: 96 % | HEIGHT: 70 IN | RESPIRATION RATE: 18 BRPM | HEART RATE: 77 BPM | DIASTOLIC BLOOD PRESSURE: 90 MMHG | TEMPERATURE: 98.7 F

## 2023-03-17 DIAGNOSIS — N20.0 RENAL CALCULUS: Primary | ICD-10-CM

## 2023-03-17 LAB
GLUCOSE BLDC GLUCOMTR-MCNC: 197 MG/DL (ref 70–130)
GLUCOSE BLDC GLUCOMTR-MCNC: 257 MG/DL (ref 70–130)

## 2023-03-17 PROCEDURE — 25010000002 ONDANSETRON PER 1 MG: Performed by: NURSE ANESTHETIST, CERTIFIED REGISTERED

## 2023-03-17 PROCEDURE — 25010000002 LEVOFLOXACIN PER 250 MG: Performed by: SURGERY

## 2023-03-17 PROCEDURE — 82962 GLUCOSE BLOOD TEST: CPT

## 2023-03-17 PROCEDURE — 25010000002 KETOROLAC TROMETHAMINE PER 15 MG: Performed by: NURSE ANESTHETIST, CERTIFIED REGISTERED

## 2023-03-17 PROCEDURE — 25010000002 PROPOFOL 10 MG/ML EMULSION: Performed by: NURSE ANESTHETIST, CERTIFIED REGISTERED

## 2023-03-17 RX ORDER — FAMOTIDINE 10 MG/ML
20 INJECTION, SOLUTION INTRAVENOUS
Status: COMPLETED | OUTPATIENT
Start: 2023-03-17 | End: 2023-03-17

## 2023-03-17 RX ORDER — PROMETHAZINE HYDROCHLORIDE 25 MG/1
25 SUPPOSITORY RECTAL ONCE AS NEEDED
Status: DISCONTINUED | OUTPATIENT
Start: 2023-03-17 | End: 2023-03-17 | Stop reason: HOSPADM

## 2023-03-17 RX ORDER — SODIUM CHLORIDE 0.9 % (FLUSH) 0.9 %
10 SYRINGE (ML) INJECTION EVERY 12 HOURS SCHEDULED
Status: DISCONTINUED | OUTPATIENT
Start: 2023-03-17 | End: 2023-03-17 | Stop reason: HOSPADM

## 2023-03-17 RX ORDER — PROPOFOL 10 MG/ML
VIAL (ML) INTRAVENOUS AS NEEDED
Status: DISCONTINUED | OUTPATIENT
Start: 2023-03-17 | End: 2023-03-17 | Stop reason: SURG

## 2023-03-17 RX ORDER — TAMSULOSIN HYDROCHLORIDE 0.4 MG/1
1 CAPSULE ORAL DAILY
Qty: 30 CAPSULE | Refills: 2 | Status: SHIPPED | OUTPATIENT
Start: 2023-03-17

## 2023-03-17 RX ORDER — FLUMAZENIL 0.1 MG/ML
0.2 INJECTION INTRAVENOUS AS NEEDED
Status: DISCONTINUED | OUTPATIENT
Start: 2023-03-17 | End: 2023-03-17 | Stop reason: HOSPADM

## 2023-03-17 RX ORDER — HYDRALAZINE HYDROCHLORIDE 20 MG/ML
5 INJECTION INTRAMUSCULAR; INTRAVENOUS
Status: DISCONTINUED | OUTPATIENT
Start: 2023-03-17 | End: 2023-03-17 | Stop reason: HOSPADM

## 2023-03-17 RX ORDER — ONDANSETRON 2 MG/ML
4 INJECTION INTRAMUSCULAR; INTRAVENOUS ONCE AS NEEDED
Status: DISCONTINUED | OUTPATIENT
Start: 2023-03-17 | End: 2023-03-17 | Stop reason: HOSPADM

## 2023-03-17 RX ORDER — SODIUM CHLORIDE, SODIUM LACTATE, POTASSIUM CHLORIDE, CALCIUM CHLORIDE 600; 310; 30; 20 MG/100ML; MG/100ML; MG/100ML; MG/100ML
9 INJECTION, SOLUTION INTRAVENOUS CONTINUOUS PRN
Status: DISCONTINUED | OUTPATIENT
Start: 2023-03-17 | End: 2023-03-17 | Stop reason: HOSPADM

## 2023-03-17 RX ORDER — OXYCODONE AND ACETAMINOPHEN 7.5; 325 MG/1; MG/1
1 TABLET ORAL EVERY 4 HOURS PRN
Status: DISCONTINUED | OUTPATIENT
Start: 2023-03-17 | End: 2023-03-17 | Stop reason: HOSPADM

## 2023-03-17 RX ORDER — SODIUM CHLORIDE 0.9 % (FLUSH) 0.9 %
10 SYRINGE (ML) INJECTION AS NEEDED
Status: DISCONTINUED | OUTPATIENT
Start: 2023-03-17 | End: 2023-03-17 | Stop reason: HOSPADM

## 2023-03-17 RX ORDER — HYDROMORPHONE HYDROCHLORIDE 1 MG/ML
0.5 INJECTION, SOLUTION INTRAMUSCULAR; INTRAVENOUS; SUBCUTANEOUS
Status: DISCONTINUED | OUTPATIENT
Start: 2023-03-17 | End: 2023-03-17 | Stop reason: HOSPADM

## 2023-03-17 RX ORDER — LIDOCAINE HYDROCHLORIDE 20 MG/ML
INJECTION, SOLUTION INFILTRATION; PERINEURAL AS NEEDED
Status: DISCONTINUED | OUTPATIENT
Start: 2023-03-17 | End: 2023-03-17 | Stop reason: SURG

## 2023-03-17 RX ORDER — DIPHENHYDRAMINE HYDROCHLORIDE 50 MG/ML
12.5 INJECTION INTRAMUSCULAR; INTRAVENOUS
Status: DISCONTINUED | OUTPATIENT
Start: 2023-03-17 | End: 2023-03-17 | Stop reason: HOSPADM

## 2023-03-17 RX ORDER — DROPERIDOL 2.5 MG/ML
0.62 INJECTION, SOLUTION INTRAMUSCULAR; INTRAVENOUS
Status: DISCONTINUED | OUTPATIENT
Start: 2023-03-17 | End: 2023-03-17 | Stop reason: HOSPADM

## 2023-03-17 RX ORDER — MIDAZOLAM HYDROCHLORIDE 1 MG/ML
1 INJECTION INTRAMUSCULAR; INTRAVENOUS
Status: DISCONTINUED | OUTPATIENT
Start: 2023-03-17 | End: 2023-03-17 | Stop reason: HOSPADM

## 2023-03-17 RX ORDER — NALOXONE HCL 0.4 MG/ML
0.2 VIAL (ML) INJECTION AS NEEDED
Status: DISCONTINUED | OUTPATIENT
Start: 2023-03-17 | End: 2023-03-17 | Stop reason: HOSPADM

## 2023-03-17 RX ORDER — LABETALOL HYDROCHLORIDE 5 MG/ML
5 INJECTION, SOLUTION INTRAVENOUS
Status: DISCONTINUED | OUTPATIENT
Start: 2023-03-17 | End: 2023-03-17 | Stop reason: HOSPADM

## 2023-03-17 RX ORDER — SODIUM CHLORIDE 9 MG/ML
40 INJECTION, SOLUTION INTRAVENOUS AS NEEDED
Status: DISCONTINUED | OUTPATIENT
Start: 2023-03-17 | End: 2023-03-17 | Stop reason: HOSPADM

## 2023-03-17 RX ORDER — IPRATROPIUM BROMIDE AND ALBUTEROL SULFATE 2.5; .5 MG/3ML; MG/3ML
3 SOLUTION RESPIRATORY (INHALATION) ONCE AS NEEDED
Status: DISCONTINUED | OUTPATIENT
Start: 2023-03-17 | End: 2023-03-17 | Stop reason: HOSPADM

## 2023-03-17 RX ORDER — EPHEDRINE SULFATE 50 MG/ML
5 INJECTION, SOLUTION INTRAVENOUS ONCE AS NEEDED
Status: DISCONTINUED | OUTPATIENT
Start: 2023-03-17 | End: 2023-03-17 | Stop reason: HOSPADM

## 2023-03-17 RX ORDER — FENTANYL CITRATE 50 UG/ML
50 INJECTION, SOLUTION INTRAMUSCULAR; INTRAVENOUS
Status: DISCONTINUED | OUTPATIENT
Start: 2023-03-17 | End: 2023-03-17 | Stop reason: HOSPADM

## 2023-03-17 RX ORDER — PROMETHAZINE HYDROCHLORIDE 25 MG/1
25 TABLET ORAL ONCE AS NEEDED
Status: DISCONTINUED | OUTPATIENT
Start: 2023-03-17 | End: 2023-03-17 | Stop reason: HOSPADM

## 2023-03-17 RX ORDER — LEVOFLOXACIN 5 MG/ML
500 INJECTION, SOLUTION INTRAVENOUS ONCE
Status: COMPLETED | OUTPATIENT
Start: 2023-03-17 | End: 2023-03-17

## 2023-03-17 RX ORDER — KETOROLAC TROMETHAMINE 30 MG/ML
INJECTION, SOLUTION INTRAMUSCULAR; INTRAVENOUS AS NEEDED
Status: DISCONTINUED | OUTPATIENT
Start: 2023-03-17 | End: 2023-03-17 | Stop reason: SURG

## 2023-03-17 RX ORDER — ONDANSETRON 2 MG/ML
INJECTION INTRAMUSCULAR; INTRAVENOUS AS NEEDED
Status: DISCONTINUED | OUTPATIENT
Start: 2023-03-17 | End: 2023-03-17 | Stop reason: SURG

## 2023-03-17 RX ORDER — HYDROCODONE BITARTRATE AND ACETAMINOPHEN 7.5; 325 MG/1; MG/1
1 TABLET ORAL ONCE AS NEEDED
Status: DISCONTINUED | OUTPATIENT
Start: 2023-03-17 | End: 2023-03-17 | Stop reason: HOSPADM

## 2023-03-17 RX ADMIN — ONDANSETRON 4 MG: 2 INJECTION INTRAMUSCULAR; INTRAVENOUS at 09:38

## 2023-03-17 RX ADMIN — LEVOFLOXACIN 500 MG: 500 INJECTION, SOLUTION INTRAVENOUS at 08:57

## 2023-03-17 RX ADMIN — LIDOCAINE HYDROCHLORIDE 80 MG: 20 INJECTION, SOLUTION INFILTRATION; PERINEURAL at 09:11

## 2023-03-17 RX ADMIN — SODIUM CHLORIDE, POTASSIUM CHLORIDE, SODIUM LACTATE AND CALCIUM CHLORIDE 9 ML/HR: 600; 310; 30; 20 INJECTION, SOLUTION INTRAVENOUS at 08:40

## 2023-03-17 RX ADMIN — PROPOFOL 300 MG: 10 INJECTION, EMULSION INTRAVENOUS at 09:11

## 2023-03-17 RX ADMIN — FAMOTIDINE 20 MG: 10 INJECTION INTRAVENOUS at 08:40

## 2023-03-17 RX ADMIN — KETOROLAC TROMETHAMINE 30 MG: 30 INJECTION, SOLUTION INTRAMUSCULAR; INTRAVENOUS at 09:38

## 2023-03-17 NOTE — H&P
[facility] History & Physical      Patient Identification:  NAME:  Angelito Hart  Age:  57 y.o.   Sex:  male   :  1965   MRN:  [id number]       Chief complaint: Planned Procedure    History of present illness:  Angelito Hart is a 57 y.o. patient with left nephrolithiasis. No changes since last seen in clinic.      Past medical history:  Past Medical History:   Diagnosis Date   • Abnormal ECG 2017    PCP ARNP in-office EKG, irregular beat, not concerned   • Arrhythmia 2017    PCP ARNP in-office EKG, irregular beat, not concerned   • Depression    • Diabetes mellitus (HCC)     TYPE 2   • Family history of premature CAD 2018   • Hypercalcemia    • Hyperlipidemia    • Hypertension    • Hypogonadism in male    • Increased PTH level    • Kidney stones    • Obesity    • Sleep apnea     uses cpap       Past surgical history:  Past Surgical History:   Procedure Laterality Date   • BRANCHIAL CLEFT CYST EXCISION     • COLONOSCOPY N/A 2017    Procedure: COLONOSCOPY INTO TERMINAL ILEUM WITH COLD BIOPSY POLYPECTOMIES;  Surgeon: Kelby Winter MD;  Location: [facility];  Service:    • CYSTOSCOPY         • EXTRACORPOREAL SHOCK WAVE LITHOTRIPSY (ESWL) Left 09/15/2017    Procedure: EXTRACORPOREAL SHOCKWAVE LITHOTRIPSY WITH CYSTOSCOPY AND STENT PLACEMENT AND LEFT UTEROSCOPY;  Surgeon: Otilio Mcgrath MD;  Location: [facility];  Service:    • EXTRACORPOREAL SHOCK WAVE LITHOTRIPSY (ESWL) Left 10/11/2019    Procedure: EXTRACORPOREAL SHOCKWAVE LITHOTRIPSY LEFT;  Surgeon: Otilio Mcgrath MD;  Location[place] [facility];  Service: Urology   • EYE SURGERY      LASIK   • TONSILLECTOMY     • URETEROSCOPY LASER LITHOTRIPSY WITH STENT INSERTION Left 2020    Procedure: CYSROSCOPY URETEROSCOPY LASER LITHOTRIPSY STONE BASKET EXTRACTION STENT WITH STRING;  Surgeon: Josse Garcia MD;  Location: [facility] OR;  Service: Urology       Allergies:  Penicillins and Sertraline  hcl    Home medications:  No medications prior to admission.        Hospital medications:    No current facility-administered medications for this encounter.        Family history:  Family History   Problem Relation Age of Onset   • Diabetes Mother    • Arrhythmia Mother         got pacemaker 2 years ago to regulate or something like that - living, about age 80   • Heart attack Father         quadruple bypass, diabetic neuropathy, amputations,  age 54   • Heart disease Father         quadruple bypass   • Diabetes Father    • Sudden death Father    • Heart failure Maternal Uncle    • Diabetes Maternal Grandmother    • Heart attack Paternal Grandfather         death from heart attack when my father was a child   • Diabetes Son    • Heart failure Maternal Uncle          age 64   • Malig Hyperthermia Neg Hx        Social history:  Social History     Tobacco Use   • Smoking status: Former     Packs/day: 0.50     Years: 21.00     Pack years: 10.50     Types: Cigarettes     Start date: 1998     Quit date: 10/27/2019     Years since quitting: 3.3   • Smokeless tobacco: Never   • Tobacco comments:     after 21 years, I quit on 10/27/19   Vaping Use   • Vaping Use: Never used   Substance Use Topics   • Alcohol use: No     Comment: Caffeine use   • Drug use: No       REVIEW OF SYSTEMS:  Constitutional - Negative for fevers/chills  Eyes/Ears/Nose/Mouth/Throat - Negative for changes in vision  Cardiovascular - Negative for chest pain, dysrhythmia  Respiratory - Negative for dyspnea  Gastrointestinal - Negative for nausea or vomiting  Genitourinary - Negative for dysuria  Hematologic/Lymphatic - Negative for bruising  Skin - Negative for erythema  Endocrine - Negative for polyuria    Objective:  TMax 24 hours:   No data recorded.      Vitals Ranges:        Intake/Output Last 3 shifts:  No intake/output data recorded.     Physical Exam:    General Appearance:    Alert, cooperative, NAD   HEENT:    Normocephalic    Back:     No CVA tenderness   Lungs:     Respirations unlabored, no wheezing    Heart:    Reg rate   Abdomen:     Soft, NDNT, no masses, no guarding   :    Normal ext genitalia   Extremities:   No deformity       Skin:   No bleeding, bruising or rashes   Neuro/Psych:   Orientation intact, mood/affect pleasant, no focal findings       Results review:   I reviewed the patient's new clinical results.    Data review:  Lab Results (last 24 hours)     ** No results found for the last 24 hours. **           Imaging:  Imaging Results (Last 24 Hours)     ** No results found for the last 24 hours. **             Assessment:       * No active hospital problems. *      Left nephrolithiasis    Plan:     Proceed with Left ESWL    The risks, benefits and alternatives of the procedure were discussed in detail with the patient including risk of bleeding, infection, damage to surrounding structures, pain, erectile dysfunction, inability to clear the stone, inability to place stent requiring PCNT, need for more procedures, recurrence of the disease, need for dialysis, thromboembolism, MI, stroke, coma, and even death. The patient verbalized understanding and all questions were answered. They agreed to proceed with the procedure.        Arun Potts MD  [facility]

## 2023-03-17 NOTE — OP NOTE
Operative Note  3/17/2023    Angelito Hubbrad y.o.  1965  male  [id number]    Attending Surgeon: Arun Potts MD    Assistant(s): None    Pre-operative Diagnosis:   1. Left Nephrolithiasis    Post-operative Diagnosis: Same    Procedure:   1. Left ESWL (Staged procedure)    Indication:  Symptomatic left nephrolithiasis, imaging confirmed. Primary Urologist is Dr. Goldberg.    We discussed the benefits/risks/expectations and the patient desires surgical intervention. Risks include bleeding, infection, urinary tract infection/sepsis, retained stone fragments, damage to adjacent tissues including the genitalia, chronic pain, numbness, incontinence, stroke, heart attack, death, and need for additional procedures.    Findings:   Fluoroscopy demonstrated a radio-opaque stones at the lower pole of the renal shadow, consistent with patient's known stone burden.     Stones fragmented well. 18 kV at 60 shocks/minute for 3000 shocks.  The rate was increased to 120 shocks/minute at rapidly escalating voltage to 24 kV.    Description of procedure:  After informed consent was obtained, the patient was taken to the OR and general anesthesia was induced. The patient was placed on the lithotripter table in a supine position and placed under general anesthesia. A timeout was performed and all team members were in agreement. Fluoroscopy demonstrated a radio-opaque stones at the lower pole of the renal shadow, consistent with patient's known stone burden. . The patient was prepped and draped.     Left ESWL was then performed.  A total of 3000 shocks were administered to the stone at a max energy level 24kV. The stone fragmented well. The patient tolerated the procedure well and was awakened from anesthesia in the OR. The patient was transferred to the recovery room in stable condition.    Complications: None    Specimens: None    Estimated Blood Loss: Minimal    Plan:kj  Pt to f/u w/ Dr. Goldberg in 2-4 weeks w/ VENANCIOB  prior    Arun Potts MD  [facility]

## 2023-03-17 NOTE — ANESTHESIA PREPROCEDURE EVALUATION
Anesthesia Evaluation     Patient summary reviewed   NPO Solid Status: > 8 hours             Airway   Mallampati: II  Neck ROM: full  Dental      Pulmonary     breath sounds clear to auscultation  (+) sleep apnea,   Cardiovascular     ECG reviewed  Rhythm: regular    (+) hypertension, dysrhythmias (hx PVCs),       Neuro/Psych  GI/Hepatic/Renal/Endo    (+) obesity,   renal disease stones, diabetes mellitus,     Musculoskeletal     Abdominal    Substance History      OB/GYN          Other                        Anesthesia Plan    ASA 3     general       Anesthetic plan, risks, benefits, and alternatives have been provided, discussed and informed consent has been obtained with: patient.    Use of blood products discussed with patient .       CODE STATUS:

## 2023-03-17 NOTE — ANESTHESIA POSTPROCEDURE EVALUATION
"Patient: Angelito Hart    Procedure Summary     Date: 03/17/23 Room / Location: [facility] OR  / [facility] MAIN OR    Anesthesia Start: 0904 Anesthesia Stop: 0955    Procedure: EXTRACORPOREAL SHOCKWAVE LITHOTRIPSY (Left) Diagnosis:     Surgeons: Arun Potts MD Provider: Veto Lundy MD    Anesthesia Type: general ASA Status: 3          Anesthesia Type: general    Vitals  Vitals Value Taken Time   /104 03/17/23 1016   Temp 37.1 °C (98.7 °F) 03/17/23 1025   Pulse 84 03/17/23 1027   Resp 18 03/17/23 1015   SpO2 94 % 03/17/23 1028   Vitals shown include unvalidated device data.        Post Anesthesia Care and Evaluation    Patient location during evaluation: bedside  Patient participation: complete - patient participated  Level of consciousness: sleepy but conscious  Pain score: 0  Pain management: adequate    Airway patency: patent  Anesthetic complications: No anesthetic complications    Cardiovascular status: acceptable  Respiratory status: acceptable  Hydration status: acceptable    Comments: /86   Pulse 78   Temp 37.1 °C (98.7 °F) (Oral)   Resp 20   Ht 177.8 cm (70\")   SpO2 96%   BMI 37.68 kg/m²         "

## 2023-03-17 NOTE — ADDENDUM NOTE
Addendum  created 03/17/23 1305 by Veto Lundy MD    Attestation recorded in Intraprocedure, Intraprocedure Attestations filed

## 2023-03-17 NOTE — ANESTHESIA PROCEDURE NOTES
Airway  Urgency: elective    Date/Time: 3/17/2023 9:13 AM    General Information and Staff    Patient location during procedure: OR  CRNA/CAA: Sandi Portillo CRNA    Indications and Patient Condition  Indications for airway management: airway protection    Preoxygenated: yes  MILS maintained throughout  Mask difficulty assessment: 1 - vent by mask    Final Airway Details  Final airway type: supraglottic airway      Successful airway: LMA  Size 4     Number of attempts at approach: 1  Assessment: lips, teeth, and gum same as pre-op and atraumatic intubation    Additional Comments  Teeth, tongue, lips, and gums in preop condition. VSS throughout. Easy mask/smooth easy insertion.

## 2023-04-24 ENCOUNTER — OFFICE VISIT (OUTPATIENT)
Dept: SLEEP MEDICINE | Facility: HOSPITAL | Age: 58
End: 2023-04-24
Payer: COMMERCIAL

## 2023-04-24 VITALS
BODY MASS INDEX: 37.51 KG/M2 | HEIGHT: 70 IN | HEART RATE: 94 BPM | WEIGHT: 262 LBS | SYSTOLIC BLOOD PRESSURE: 140 MMHG | DIASTOLIC BLOOD PRESSURE: 78 MMHG | OXYGEN SATURATION: 95 %

## 2023-04-24 DIAGNOSIS — Z99.89 OSA ON CPAP: Primary | ICD-10-CM

## 2023-04-24 DIAGNOSIS — G47.33 OSA ON CPAP: Primary | ICD-10-CM

## 2023-04-24 PROCEDURE — G0463 HOSPITAL OUTPT CLINIC VISIT: HCPCS

## 2023-04-24 NOTE — PROGRESS NOTES
"      [facility]         Dr Shirley Chery  [unfilled]  Patient Care Team:  Lynn Watson APRN as PCP - General (Nurse Practitioner)  Omar Alvarado III, MD as Consulting Physician (Cardiology)    Chief Complaint: moderate SOHAM AHI 14.5 events per hour date of the study I have is from 2012    Interval History: Patient here for compliance visit after set up of new CPAP 12 cm.  Compliance today 98% average daily use 6 hours 56 minutes.  AHI leak look excellent.  Goes to bed 11 gets up 6 gets about 7+ hours of sleep and feels rested.  No tobacco no alcohol no caffeine abuse.  Currently has a nasal mask that fits well.  Supplies been adequate.     REVIEW OF SYSTEMS:   CARDIOVASCULAR: No chest pain, chest pressure or chest discomfort. No palpitations or edema.   RESPIRATORY: No shortness of breath, cough or sputum.   GASTROINTESTINAL: No anorexia, nausea, vomiting or diarrhea. No abdominal pain or blood.   HEMATOLOGIC: No bleeding or bruising.     Ventilator/Non-Invasive Ventilation Settings (From admission, onward)    None            Vital Signs  Heart Rate:  [94] 94  BP: (140)/(78) 140/78  [unfilled]  Flowsheet Rows    Flowsheet Row First Filed Value   Admission Height 177.8 cm (70\") Documented at 04/24/2023 1527   Admission Weight 119 kg (262 lb) Documented at 04/24/2023 1527          Physical Exam:  Patient is examined using the personal protective equipment as per guidelines from infection control for this particular patient as enacted.  Hand hygiene was performed before and after patient interaction.   General Appearance:    Alert, cooperative, in no acute distress.  Following simple commands  ENT Mallampati between 3 and 4 no nasal congestion  Neck midline trachea, no thyromegaly   Lungs:     Clear to auscultation, respirations regular, even and                  unlabored    Heart:    Regular rhythm and normal rate, normal S1 and S2, no            murmur, no gallop, no rub, no click   Chest Wall:    " No abnormalities observed   Abdomen:     Normal bowel sounds, no masses, no organomegaly, soft        nontender, nondistended, no guarding, no rebound                tenderness   Extremities:   Moves all extremities well, no edema, no cyanosis, no             redness  CNS no focal neurological deficits normal sensory exam  Skin no rashes no nodules  Musculoskeletal no cyanosis no clubbing normal range of motion     Results Review:                                          I reviewed the patient's new clinical results.  I personally viewed and interpreted the patient's chest x-ray.        Medication Review:       No current facility-administered medications for this visit.      ASSESSMENT:   SOHAM on CPAP  Hypertension  Diabetes mellitus  Obesity  PVCs    PLAN:  Reviewed compliance download with the patient  Compliance AHI leak look excellent  Continue with current CPAP 12 cm with sleep hygiene measures  Treatment of underlying comorbidities  Weight loss encouraged  Follow-up in 1 year            Shirley Chery MD  04/24/23  15:38 EDT

## 2023-12-21 ENCOUNTER — TELEPHONE (OUTPATIENT)
Dept: CARDIOLOGY | Facility: CLINIC | Age: 58
End: 2023-12-21
Payer: COMMERCIAL

## 2023-12-21 DIAGNOSIS — Z82.49 FAMILY HISTORY OF PREMATURE CAD: Chronic | ICD-10-CM

## 2023-12-21 DIAGNOSIS — I10 PRIMARY HYPERTENSION: Chronic | ICD-10-CM

## 2023-12-21 DIAGNOSIS — I49.3 FREQUENT PVCS: Chronic | ICD-10-CM

## 2023-12-21 DIAGNOSIS — E78.2 MIXED HYPERLIPIDEMIA: Chronic | ICD-10-CM

## 2023-12-21 NOTE — TELEPHONE ENCOUNTER
"Caller: Angelito Hart \"ANA\"    Relationship: Self    Best call back number: [phone]     Requested Prescriptions:   Requested Prescriptions     Pending Prescriptions Disp Refills    metoprolol succinate XL (TOPROL-XL) 50 MG 24 hr tablet 90 tablet 3     Sig: Take 1 tablet by mouth Daily.        Pharmacy where request should be sent: [facility] [id number] [facility] [address] [facility] [phone] [facility] [phone]      Last office visit with prescribing clinician: 2022   Last telemedicine visit with prescribing clinician: Visit date not found   Next office visit with prescribing clinician: Visit date not found     Additional details provided by patient: PT STATES THAT HE HAS PVCS PER DR FLANAGAN - HE HAS BEEN TAKING METOPROLOL FOR A FEW YEARS AND HE WAS TOLD DURING HIS LAST VISIT THAT HE DIDNT NEED TO COME BACK IN AS THE METOPROLOL WAS TAKING CARE OF THE PVCS - PT IS NEEDING A REFILL AND ISNT SURE IF AN APPT IS NEEDED FOR REFILLS BUT HIS PRESCRIPTION HAS  - HIS INSURANCE IS MANDATING 30 DAY REFILLS     Does the patient have less than a 3 day supply:  [x] Yes  [x] No    Would you like a call back once the refill request has been completed: [x] Yes [] No    Malinda Krause Rep   23 12:18 EST       "

## 2023-12-26 RX ORDER — METOPROLOL SUCCINATE 50 MG/1
50 TABLET, EXTENDED RELEASE ORAL DAILY
Qty: 90 TABLET | Refills: 3 | Status: SHIPPED | OUTPATIENT
Start: 2023-12-26

## 2024-01-12 ENCOUNTER — TELEMEDICINE (OUTPATIENT)
Dept: FAMILY MEDICINE CLINIC | Facility: TELEHEALTH | Age: 59
End: 2024-01-12
Payer: COMMERCIAL

## 2024-01-12 DIAGNOSIS — B96.89 BACTERIAL SINUSITIS: Primary | ICD-10-CM

## 2024-01-12 DIAGNOSIS — J32.9 BACTERIAL SINUSITIS: Primary | ICD-10-CM

## 2024-01-12 RX ORDER — GLIMEPIRIDE 4 MG/1
4 TABLET ORAL
COMMUNITY
Start: 2023-07-25

## 2024-01-12 RX ORDER — AZITHROMYCIN 250 MG/1
TABLET, FILM COATED ORAL
Qty: 6 TABLET | Refills: 0 | Status: SHIPPED | OUTPATIENT
Start: 2024-01-12

## 2024-01-12 RX ORDER — ICOSAPENT ETHYL 1000 MG/1
2 CAPSULE ORAL
COMMUNITY
Start: 2023-07-25

## 2024-01-12 NOTE — PROGRESS NOTES
Chief Complaint   Patient presents with    Cough    Nasal Congestion       Video Visit Reason:   Free Text Description: My wife, son and I have been sick for a number of days.  Wife and son did a urgent care virtual visit, both got a Z-vaibhav sent to the pharmacy and their symptoms cleared up quickly.  I'm looking to do the same thing. (sore throat, coughing up phlegm, really bad cold chills, lightheaded, nasal congestion, and now my sense of taste is gone)  Subjective   Angelito Hart is a 58 y.o. male.     History of Present Illness  Cough, congestion, nasal congestion, sinus pain and pressure over the last several days. He says that he has the same symptoms as his wife and son had and they were given a Zpak and they got better quickly and he is wanting to get the same medication. He has felt poorly for several days with hoarseness and sore throat worsening. He has DMT2.  Cough  This is a new problem. The current episode started in the past 7 days. The problem has been worsening. The cough is Productive of sputum. Associated symptoms include nasal congestion, postnasal drip and a sore throat. Pertinent negatives include no chest pain, fever, shortness of breath or wheezing. He has tried OTC cough suppressant for the symptoms. The treatment provided no relief.       The following portions of the patient's history were reviewed and updated as appropriate: allergies, current medications, past medical history, and problem list.      Past Medical History:   Diagnosis Date    Abnormal ECG July 2017    PCP ARNP in-office EKG, irregular beat, not concerned    Arrhythmia July 2017    PCP ARNP in-office EKG, irregular beat, not concerned    Depression     Diabetes mellitus     TYPE 2    Family history of premature CAD 05/18/2018    Hypercalcemia     Hyperlipidemia     Hypertension     Hypogonadism in male     Increased PTH level     Kidney stones     Obesity     Sleep apnea     uses cpap     Social History     Socioeconomic  History    Marital status:    Tobacco Use    Smoking status: Former     Packs/day: 0.50     Years: 21.00     Additional pack years: 0.00     Total pack years: 10.50     Types: Cigarettes     Start date: 1998     Quit date: 10/27/2019     Years since quittin.2    Smokeless tobacco: Never    Tobacco comments:     after 21 years, I quit on 10/27/19   Vaping Use    Vaping Use: Never used   Substance and Sexual Activity    Alcohol use: No     Comment: Caffeine use    Drug use: No    Sexual activity: Not Currently     Partners: Female     Birth control/protection: Abstinence     medication documentation: reviewed and updated with patient and   Current Outpatient Medications:     aspirin 81 MG EC tablet, Take 1 tablet by mouth Daily. HOLDING SINCE 3/8/2023, Disp: , Rfl:     Empagliflozin (JARDIANCE) 25 MG tablet, Take 1 tablet by mouth Daily., Disp: , Rfl:     glimepiride (AMARYL) 4 MG tablet, Take 1 tablet by mouth., Disp: , Rfl:     icosapent ethyl (VASCEPA) 1 g capsule capsule, Take 2 g by mouth 2 (Two) Times a Day., Disp: , Rfl:     icosapent ethyl (VASCEPA) 1 g capsule capsule, Take 2 g by mouth., Disp: , Rfl:     lisinopril-hydrochlorothiazide (PRINZIDE,ZESTORETIC) 20-12.5 MG per tablet, Take 1 tablet by mouth Daily., Disp: , Rfl:     metFORMIN (GLUCOPHAGE) 1000 MG tablet, Take 1 tablet by mouth 2 (Two) Times a Day With Meals., Disp: , Rfl:     metoprolol succinate XL (TOPROL-XL) 50 MG 24 hr tablet, Take 1 tablet by mouth Daily., Disp: 90 tablet, Rfl: 3    Semaglutide 14 MG tablet, Take 1 tablet by mouth Daily., Disp: , Rfl:     Semaglutide 14 MG tablet, Take 1 tablet by mouth Daily., Disp: , Rfl:     simvastatin (ZOCOR) 40 MG tablet, Take 1 tablet by mouth Every Night., Disp: , Rfl:     SITagliptin (JANUVIA) 100 MG tablet, Take 1 tablet by mouth Daily., Disp: , Rfl:     vitamin D (ERGOCALCIFEROL) 1.25 MG (30734 UT) capsule capsule, Take 1 capsule by mouth Every 7 (Seven) Days. , Disp: , Rfl:      azithromycin (Zithromax Z-Ajay) 250 MG tablet, Take 2 tabs on Day 1, then 1 tab daily for 4 additional days, Disp: 6 tablet, Rfl: 0    glimepiride (AMARYL) 2 MG tablet, Take 1 tablet by mouth Daily., Disp: , Rfl:   Review of Systems   Constitutional:  Negative for fever.   HENT:  Positive for congestion, postnasal drip, sinus pressure, sinus pain, sore throat and voice change. Negative for trouble swallowing.    Respiratory:  Positive for cough. Negative for shortness of breath and wheezing.    Cardiovascular:  Negative for chest pain.   Gastrointestinal:  Negative for nausea and vomiting.       Objective   Physical Exam  Constitutional:       Appearance: Normal appearance. He is well-developed.   HENT:      Nose: Congestion present.      Right Sinus: Maxillary sinus tenderness present.      Left Sinus: Maxillary sinus tenderness present.   Eyes:      Conjunctiva/sclera: Conjunctivae normal.   Pulmonary:      Effort: Pulmonary effort is normal.   Psychiatric:         Speech: Speech normal.         Assessment & Plan   Diagnoses and all orders for this visit:    1. Bacterial sinusitis (Primary)  -     azithromycin (Zithromax Z-Ajay) 250 MG tablet; Take 2 tabs on Day 1, then 1 tab daily for 4 additional days  Dispense: 6 tablet; Refill: 0                  Follow Up:  If your symptoms are not resolving by the completion of your treatment or are worsening, see your primary care provider for follow up. If you don't have a primary care provider, you may go to any Urgent Care for re-evaluation. If you develop any life threatening symptoms, go to the nearest Emergency Department immediately or call EMS.               The use of  Video Visit was utilized during this visit, using both Directly and Twilio/Epic. The use of a video visit has been reviewed with the patient and verbal informed consent has been obtained. No technical difficulties occurred during the visit.    is located at [address] [place] [address]  [id number]  Provider is located at [place], [place]

## 2024-03-12 ENCOUNTER — OFFICE VISIT (OUTPATIENT)
Dept: SLEEP MEDICINE | Facility: HOSPITAL | Age: 59
End: 2024-03-12
Payer: COMMERCIAL

## 2024-03-12 VITALS
OXYGEN SATURATION: 95 % | WEIGHT: 259 LBS | DIASTOLIC BLOOD PRESSURE: 81 MMHG | HEIGHT: 70 IN | BODY MASS INDEX: 37.08 KG/M2 | HEART RATE: 84 BPM | SYSTOLIC BLOOD PRESSURE: 137 MMHG

## 2024-03-12 DIAGNOSIS — E66.9 OBESITY (BMI 30-39.9): ICD-10-CM

## 2024-03-12 DIAGNOSIS — G47.33 OBSTRUCTIVE SLEEP APNEA: Primary | ICD-10-CM

## 2024-03-12 PROCEDURE — G0463 HOSPITAL OUTPT CLINIC VISIT: HCPCS

## 2024-03-12 NOTE — PROGRESS NOTES
"[facility]EP [facility]  [address]  [facility] [address] [phone]    PCP: Provider, No Known    Reason for visit:  Sleep disorders: SOHAM    Angelito \"ANA\" is a 58 y.o.male who was seen in the [facility] today. Annual fu. Follows Dr. Chery. He sleeps from 10:30pm to 5:30am. He uses nasal mask and it fits well. He replaces every 4-5 mths. No leaks or dry mouth. He wakes up rested and refreshed. No EDS.  [place] Sleepiness Scale is 5. Caffeine 4 per day. Alcohol 0 per week.    Angelito \"ANA\"  reports that he quit smoking about 4 years ago. His smoking use included cigarettes. He started smoking about 25 years ago. He has a 10.7 pack-year smoking history. He has never used smokeless tobacco.    Pertinent Positive Review of Systems of denies  Rest of Review of Systems was negative as recorded in Sleep Questionnaire.    Patient  has a past medical history of Abnormal ECG (July 2017), Arrhythmia (July 2017), Depression, Diabetes mellitus, Family history of premature CAD (05/18/2018), Hypercalcemia, Hyperlipidemia, Hypertension, Hypogonadism in male, Increased PTH level, Kidney stones, Obesity, and Sleep apnea.     Current Medications:    Current Outpatient Medications:     aspirin 81 MG EC tablet, Take 1 tablet by mouth Daily. HOLDING SINCE 3/8/2023, Disp: , Rfl:     azithromycin (Zithromax Z-Ajay) 250 MG tablet, Take 2 tabs on Day 1, then 1 tab daily for 4 additional days, Disp: 6 tablet, Rfl: 0    Empagliflozin (JARDIANCE) 25 MG tablet, Take 1 tablet by mouth Daily., Disp: , Rfl:     glimepiride (AMARYL) 2 MG tablet, Take 1 tablet by mouth Daily., Disp: , Rfl:     glimepiride (AMARYL) 4 MG tablet, Take 1 tablet by mouth., Disp: , Rfl:     icosapent ethyl (VASCEPA) 1 g capsule capsule, Take 2 g by mouth 2 (Two) Times a Day., Disp: , Rfl:     icosapent ethyl (VASCEPA) 1 g capsule capsule, Take 2 g by mouth., Disp: , Rfl:     lisinopril-hydrochlorothiazide " "(PRINZIDE,ZESTORETIC) 20-12.5 MG per tablet, Take 1 tablet by mouth Daily., Disp: , Rfl:     metFORMIN (GLUCOPHAGE) 1000 MG tablet, Take 1 tablet by mouth 2 (Two) Times a Day With Meals., Disp: , Rfl:     metoprolol succinate XL (TOPROL-XL) 50 MG 24 hr tablet, Take 1 tablet by mouth Daily., Disp: 90 tablet, Rfl: 3    Semaglutide 14 MG tablet, Take 1 tablet by mouth Daily., Disp: , Rfl:     simvastatin (ZOCOR) 40 MG tablet, Take 1 tablet by mouth Every Night., Disp: , Rfl:     SITagliptin (JANUVIA) 100 MG tablet, Take 1 tablet by mouth Daily., Disp: , Rfl:     vitamin D (ERGOCALCIFEROL) 1.25 MG (25594 UT) capsule capsule, Take 1 capsule by mouth Every 7 (Seven) Days. SATURDAYS, Disp: , Rfl:    also entered in Sleep Questionnaire         Vital Signs: /81   Pulse 84   Ht 177.8 cm (70\")   Wt 117 kg (259 lb)   SpO2 95%   BMI 37.16 kg/m²     Body mass index is 37.16 kg/m².       Tongue: Normal       Dentition: good       Pharynx: Posterior pharyngeal pillars are wide   Mallampatti: III (soft and hard palate and base of uvula visible)        General: Alert. Cooperative. Well developed. No acute distress.             Head:  Normocephalic. Symmetrical. Atraumatic.              Nose: No septal deviation. No drainage.          Throat: No oral lesions. No thrush. Moist mucous membranes.    Chest Wall:  Normal shape. Symmetric expansion with respiration. No tenderness.             Neck:  Trachea midline.           Lungs:  Clear to auscultation bilaterally. No wheezes. No rhonchi. No rales. Respirations regular, even and unlabored.            Heart:  Regular rhythm and normal rate. Normal S1 and S2. No murmur.     Abdomen:  Soft, non-tender and non-distended. Normal bowel sounds. No masses.  Extremities:  Moves all extremities well. No edema.    Psychiatric: Normal mood and affect.      No results found for: \"IRON\", \"TIBC\", \"FERRITIN\"    Most current available usage data reviewed on 03/12/2024:        [employer]: " "[employer]    Prescription to DME for replacement supplies as below:    nasal mask      Description Replacement    Nasal PILLOWS      A 7034 Nasal Pillows  every 3 mth    A 7033 Repl Nasal Pillows  2 per mth    Nasal MASK/CUSHION     x A 7034 Nasal Mask/Cushion  every 3 mth   x A 7032 Repl Nasal Mask/Cushion  2 per mth    Full Face MASK      A 7030 Full Face Mask  every 3 mth    A 7031 Repl Face Mask  1 per mth      A 4604 Heated Tubing  every 3 mth    A 7037 Standard Tubing  every 3 mth   x A 7035 Headgear  every 3 mth   x A 7046 Repl Humidifier Chamber  every 6 yrs   x A 7038 Disposable Filters  2 per mth   x A 7039 Non-disposable Filter  every 6 mth   x A 7036 Chin Strap  every 6 mth     Orders Placed This Encounter   Procedures    Pulmonary Results Scan          Impression:  1. Obstructive sleep apnea    2. Obesity (BMI 30-39.9)        Plan:  Angelito \"ANA\" is compliant.  I have reviewed his download and his compliance is 100%.  He replaces his nasal mask every 4 to 5 months.  We discussed the insurance approved and developed for replacement of accessories.  Otherwise benefits from the CPAP and wakes up rested and refreshed.    I reiterated the importance of effective treatment of obstructive sleep apnea with PAP machine.  Cardiovascular health risks of untreated sleep apnea were again reviewed.  Patient was asked to remain cautious if there is persistent hypersomnolence. The benefit of weight loss in reducing severity of obstructive sleep apnea was discussed.  Patient would benefit from adhering to a strict diet to achieve ideal BMI.     Change of PAP supplies regularly is important for effective use.  Change of cushion on the mask or plugs on nasal pillows along with disposable filters once every month and change of mask frame, tubing, headgear and Velcro straps every 6 months at the minimum was reiterated.    This patient is compliant with PAP machine and benefits from its use.  Apnea hypopneas index is " corrected/improved.  Daytime hypersomnolence has resolved.     Patient will follow up in this clinic in 1 year Dr. Chery    Thank you for allowing me to participate in your patient's care.    Electronically signed by Abhilash Cline MD, 03/12/24, 2:09 PM EDT.    Part of this note may be an electronic transcription/translation of spoken language to printed text using the Dragon Dictation System.

## 2024-03-21 ENCOUNTER — TELEMEDICINE (OUTPATIENT)
Dept: FAMILY MEDICINE CLINIC | Facility: TELEHEALTH | Age: 59
End: 2024-03-21
Payer: COMMERCIAL

## 2024-03-21 DIAGNOSIS — F41.9 ANXIETY: Primary | ICD-10-CM

## 2024-03-21 RX ORDER — GLIMEPIRIDE 4 MG/1
4 TABLET ORAL
COMMUNITY
Start: 2024-03-19 | End: 2024-06-17

## 2024-03-21 RX ORDER — ICOSAPENT ETHYL 1000 MG/1
2 CAPSULE ORAL
COMMUNITY
Start: 2024-03-19 | End: 2024-04-18

## 2024-03-21 NOTE — PROGRESS NOTES
Chief Complaint   Patient presents with    Anxiety       Video Visit Reason:   Free Text Description: Anxiety due to job loss  Subjective   Angelito Hart is a 58 y.o. male.     History of Present Illness  He is having anxiety related to loss of his job and new job search. He is wanting to get a referral to  for medication to help him with anxiety until he can get back to work and back to his norm.  Anxiety  Presents for initial visit. Symptoms include nervous/anxious behavior. The symptoms are aggravated by work stress.           The following portions of the patient's history were reviewed and updated as appropriate: allergies, current medications, past medical history, and problem list.      Past Medical History:   Diagnosis Date    Abnormal ECG 2017    PCP ARNP in-office EKG, irregular beat, not concerned    Arrhythmia 2017    PCP ARNP in-office EKG, irregular beat, not concerned    Depression     Diabetes mellitus     TYPE 2    Family history of premature CAD 2018    Hypercalcemia     Hyperlipidemia     Hypertension     Hypogonadism in male     Increased PTH level     Kidney stones     Obesity     Sleep apnea     uses cpap     Social History     Socioeconomic History    Marital status:    Tobacco Use    Smoking status: Former     Current packs/day: 0.00     Average packs/day: 0.5 packs/day for 21.5 years (10.7 ttl pk-yrs)     Types: Cigarettes     Start date: 1998     Quit date: 10/27/2019     Years since quittin.4    Smokeless tobacco: Never    Tobacco comments:     after 21 years, I quit on 10/27/19   Vaping Use    Vaping status: Never Used   Substance and Sexual Activity    Alcohol use: No     Comment: Caffeine use    Drug use: No    Sexual activity: Not Currently     Partners: Female     Birth control/protection: Abstinence     medication documentation: reviewed and updated with patient and   Current Outpatient Medications:     glimepiride (AMARYL) 4 MG tablet, Take 1  tablet by mouth., Disp: , Rfl:     icosapent ethyl (VASCEPA) 1 g capsule capsule, Take 2 g by mouth., Disp: , Rfl:     aspirin 81 MG EC tablet, Take 1 tablet by mouth Daily. HOLDING SINCE 3/8/2023, Disp: , Rfl:     Empagliflozin (JARDIANCE) 25 MG tablet, Take 1 tablet by mouth Daily., Disp: , Rfl:     lisinopril-hydrochlorothiazide (PRINZIDE,ZESTORETIC) 20-12.5 MG per tablet, Take 1 tablet by mouth Daily., Disp: , Rfl:     metFORMIN (GLUCOPHAGE) 1000 MG tablet, Take 1 tablet by mouth 2 (Two) Times a Day With Meals., Disp: , Rfl:     metoprolol succinate XL (TOPROL-XL) 50 MG 24 hr tablet, Take 1 tablet by mouth Daily., Disp: 90 tablet, Rfl: 3    Semaglutide 14 MG tablet, Take 1 tablet by mouth Daily., Disp: , Rfl:     simvastatin (ZOCOR) 40 MG tablet, Take 1 tablet by mouth Every Night., Disp: , Rfl:     SITagliptin (JANUVIA) 100 MG tablet, Take 1 tablet by mouth Daily., Disp: , Rfl:     vitamin D (ERGOCALCIFEROL) 1.25 MG (17851 UT) capsule capsule, Take 1 capsule by mouth Every 7 (Seven) Days. SATURDAYS, Disp: , Rfl:   Review of Systems   Psychiatric/Behavioral:  The patient is nervous/anxious.        Objective   Physical Exam  Constitutional:       General: He is not in acute distress.     Appearance: He is not ill-appearing.   Pulmonary:      Effort: Pulmonary effort is normal.   Neurological:      Mental Status: He is alert.         Assessment & Plan   Diagnoses and all orders for this visit:    1. Anxiety (Primary)  -     Ambulatory Referral to Behavioral Health                    Follow Up:  If your symptoms are not resolving by the completion of your treatment or are worsening, see your primary care provider for follow up. If you don't have a primary care provider, you may go to any Urgent Care for re-evaluation. If you develop any life threatening symptoms, go to the nearest Emergency Department immediately or call EMS.               The use of  Video Visit was utilized during this visit, using both [employer]  and YouTube/Epic. The use of a video visit has been reviewed with the patient and verbal informed consent has been obtained. No technical difficulties occurred during the visit.    is located at [address] [place] [id number]  Provider is located at [place], [place]

## 2024-03-21 NOTE — PATIENT INSTRUCTIONS
A Behavioral Health Referral has been ordered for you. Someone will be in contact with you to set up an appointment. Since Behavior health needs to be tailored to each person's individual needs and sometimes requires a combination of therapy and medication. These therapies need to be monitored under the guidance of a Behavioral Health provider. If you were set up for an Urgent Virtual Visit, someone should contact you within the next few hours. If you need assistance before you are contacted, please go to the appropriate source, your Primary care provider, Urgent Care or the Emergency Department.  
22-Feb-2023 18:15
22-Feb-2023 06:51

## 2024-03-27 ENCOUNTER — PATIENT ROUNDING (BHMG ONLY) (OUTPATIENT)
Dept: FAMILY MEDICINE CLINIC | Facility: CLINIC | Age: 59
End: 2024-03-27
Payer: COMMERCIAL

## 2024-03-27 ENCOUNTER — OFFICE VISIT (OUTPATIENT)
Dept: FAMILY MEDICINE CLINIC | Facility: CLINIC | Age: 59
End: 2024-03-27
Payer: COMMERCIAL

## 2024-03-27 VITALS
OXYGEN SATURATION: 97 % | WEIGHT: 254.8 LBS | SYSTOLIC BLOOD PRESSURE: 132 MMHG | DIASTOLIC BLOOD PRESSURE: 88 MMHG | HEART RATE: 75 BPM | HEIGHT: 70 IN | BODY MASS INDEX: 36.48 KG/M2

## 2024-03-27 DIAGNOSIS — E78.2 MIXED HYPERLIPIDEMIA: Chronic | ICD-10-CM

## 2024-03-27 DIAGNOSIS — G47.33 OBSTRUCTIVE SLEEP APNEA SYNDROME: Chronic | ICD-10-CM

## 2024-03-27 DIAGNOSIS — L98.9 SKIN LESION OF LEFT LEG: ICD-10-CM

## 2024-03-27 DIAGNOSIS — F41.9 ANXIETY: Chronic | ICD-10-CM

## 2024-03-27 DIAGNOSIS — I49.3 PVC (PREMATURE VENTRICULAR CONTRACTION): Chronic | ICD-10-CM

## 2024-03-27 DIAGNOSIS — I10 PRIMARY HYPERTENSION: Primary | Chronic | ICD-10-CM

## 2024-03-27 DIAGNOSIS — E11.9 CONTROLLED TYPE 2 DIABETES MELLITUS WITHOUT COMPLICATION, WITHOUT LONG-TERM CURRENT USE OF INSULIN: Chronic | ICD-10-CM

## 2024-03-27 NOTE — PROGRESS NOTES
"Subjective          Angelito Hart presents to [facility] for Hypertension (Establish care as new patient )   History of Present Illness    He is new to me, here to establish care and to follow-up on Hypertension and increased Anxiety:    Hypertension - he takes Lisinopril-HCTZ 20-12.5mg daily and Metoprolol Succ 50mg daily.  He reports takes Metoprolol Succ for PVCs.  He denies SOB, CP, heart palpitations and syncope.    Hyperlipidemia - he is followed by Dr. Spivey.    Diabetes - last A1c 7.3 on 3/2024.  He is followed by Dr. Spivey, Endocrinology.    Anxiety - he reports having fluctuating anxiety daily due to recent loss of job.  He has long standing history of anxiety and has been on Paxil, Effexor, Zoloft in the past.  He also reports family history of anxiety.  He indicated that only Diazepam works for him and is requesting Diazepam today.  He reports Paxil and Effexor turned him into a zombi.  He also took Zoloft in the past and it made the back of his head and neck shake when he took it about 20 years ago.  He declined referral to Psychiatry and declined referral for Therapy.  Denies SI/HI.  Advised him of National Suicide Hotline no 988.    Skin Lesion left thigh - he noticed lesion on thigh over the past several months.  He denies lesion causing pain, itching or bleeding.    SOHAM - he is compliant with C-pap nightly and tolerating well.  He denies daytime sleepiness.  He is followed by Dr. Chery.      Review of Systems       Objective   Vital Signs:   /88 (BP Location: Left arm, Patient Position: Sitting, Cuff Size: Adult) Comment: re-checked  Pulse 75   Ht 177.8 cm (70\")   Wt 116 kg (254 lb 12.8 oz)   SpO2 97%   BMI 36.56 kg/m²     Class 2 Severe Obesity (BMI >=35 and <=39.9). Obesity-related health conditions include the following: obstructive sleep apnea, hypertension, diabetes mellitus, and dyslipidemias. Obesity is newly identified. BMI is is above " average; BMI management plan is completed. We discussed low calorie, low carb based diet program, portion control, increasing exercise, joining a fitness center or start home based exercise program, and Information on healthy weight added to patient's after visit summary.    Physical Exam  Vitals and nursing note reviewed.   Constitutional:       General: He is not in acute distress.     Appearance: Normal appearance.   HENT:      Head: Normocephalic and atraumatic.   Eyes:      Conjunctiva/sclera: Conjunctivae normal.   Cardiovascular:      Rate and Rhythm: Normal rate and regular rhythm.      Heart sounds: Normal heart sounds. No murmur heard.  Pulmonary:      Effort: Pulmonary effort is normal. No respiratory distress.      Breath sounds: Normal breath sounds. No wheezing or rales.   Musculoskeletal:      Right lower leg: No edema.      Left lower leg: No edema.   Skin:     General: Skin is warm and dry.      Findings: Lesion present.      Comments: Approx 2-3mm erythema, macular with partial white rough surface on left medial thigh.   Neurological:      Mental Status: He is alert.   Psychiatric:         Attention and Perception: Attention normal. He is attentive.         Mood and Affect: Mood normal. Mood is not anxious or depressed.         Speech: Speech normal.         Behavior: Behavior normal. Behavior is cooperative.         Thought Content: Thought content normal. Thought content does not include homicidal or suicidal ideation.         Cognition and Memory: Cognition normal.         Judgment: Judgment normal.        Result Review :   The following data was reviewed by: ERNESTINE Canas on 03/27/2024:    Lipid Panel ([facility]) dated 3/20/24 within normal limits.    CBC w/diff          6/21/2023    10:21 9/13/2023    10:26 12/26/2023    10:06   CBC w/Diff   WBC 8.79     7.97     6.63       RBC 5.59     5.46     5.57       Hemoglobin 16.5     16.0     16.3       Hematocrit 47.7     46.7     47.1        MCV 85.3     85.5     84.6       MCH 29.5     29.3     29.3       MCHC 34.6     34.3     34.6       RDW 12.5     12.7     13.2       Platelets 286     292     286       Neutrophil Rel % 69.0     64.8     61.5       Immature Granulocyte Rel % 0.5     0.6     0.6       Lymphocyte Rel % 19.5     22.5     24.1       Monocyte Rel % 8.1     9.4     10.1       Eosinophil Rel % 2.3     2.1     2.9       Basophil Rel % 0.6     0.6     0.8          Details          This result is from an external source.               Data reviewed : Consultant notes ERNESTINE Davis, Endocrine, Office Note dated 1/2/24.           Assessment and Plan    Diagnoses and all orders for this visit:    1. Primary hypertension (Primary)  Assessment & Plan:  Hypertension is stable.  Decrease table salt and foods high in salt.  Weight loss.  Increase activity daily.  Continue Lisinopril-HCTZ 20-12.5mg daily & Metoprolol Succ 50mg daily.  Blood pressure will be re-assessed in 3 months.        2. Mixed hyperlipidemia  Assessment & Plan:  Controlled on labs from 3/2024.  He is followed by Dr. Spivey.      3. Controlled type 2 diabetes mellitus without complication, without long-term current use of insulin  Assessment & Plan:  Last A1c 7.3 in 3/2024.  Encouraged to decrease sugar/carbs and increase exercise daily.  He is followed by Dr. Spivey, Endocrinology.      4. Anxiety  Assessment & Plan:  C/O increased anxiety due to job loss.  Denies SI/HI.  Advised of National Suicide Hotline no 256.  Discussed importance of referral to psychiatry and therapy and he declined.  Advised patient if he changed his mind about psychiatry referral, he could send me a message through [employer] and I will submit referral to psychiatry for him.      5. PVC (premature ventricular contraction)  Assessment & Plan:  Asymptomatic.  Controlled on Metoprolol Succ 50mg daily.  Continue current treatment plan.  Will continue monitor.      6. Obstructive sleep apnea  syndrome  Assessment & Plan:  He is compliant with C-pap nightly and tolerating well.  He denies daytime sleepiness.  Encouraged continued nightly use.  He is followed by Dr. Chery, Sleep Medicine.      7. Skin lesion of left leg  Comments:  C/O skin lesion left medial thigh.  Referral to Dermatology for evaluation & treatment and patient agreed.  Orders:  -     Ambulatory Referral to Dermatology        Follow Up   Return in about 3 months (around 6/27/2024) for Next scheduled follow up - HTN, HLD, Anxiety.  Patient was given instructions and counseling regarding his condition or for health maintenance advice. Please see specific information pulled into the AVS if appropriate.

## 2024-03-27 NOTE — PATIENT INSTRUCTIONS
Calorie Counting for Weight Loss  Calories are units of energy. Your body needs a certain number of calories from food to keep going throughout the day. When you eat or drink more calories than your body needs, your body stores the extra calories mostly as fat. When you eat or drink fewer calories than your body needs, your body burns fat to get the energy it needs.  Calorie counting means keeping track of how many calories you eat and drink each day. Calorie counting can be helpful if you need to lose weight. If you eat fewer calories than your body needs, you should lose weight. Ask your health care provider what a healthy weight is for you.  For calorie counting to work, you will need to eat the right number of calories each day to lose a healthy amount of weight per week. A dietitian can help you figure out how many calories you need in a day and will suggest ways to reach your calorie goal.  A healthy amount of weight to lose each week is usually 1-2 lb (0.5-0.9 kg). This usually means that your daily calorie intake should be reduced by 500-750 calories.  Eating 1,200-1,500 calories a day can help most women lose weight.  Eating 1,500-1,800 calories a day can help most men lose weight.  What do I need to know about calorie counting?  Work with your health care provider or dietitian to determine how many calories you should get each day. To meet your daily calorie goal, you will need to:  Find out how many calories are in each food that you would like to eat. Try to do this before you eat.  Decide how much of the food you plan to eat.  Keep a food log. Do this by writing down what you ate and how many calories it had.  To successfully lose weight, it is important to balance calorie counting with a healthy lifestyle that includes regular activity.  Where do I find calorie information?    The number of calories in a food can be found on a Nutrition Facts label. If a food does not have a Nutrition Facts label, try  to look up the calories online or ask your dietitian for help.  Remember that calories are listed per serving. If you choose to have more than one serving of a food, you will have to multiply the calories per serving by the number of servings you plan to eat. For example, the label on a package of bread might say that a serving size is 1 slice and that there are 90 calories in a serving. If you eat 1 slice, you will have eaten 90 calories. If you eat 2 slices, you will have eaten 180 calories.  How do I keep a food log?  After each time that you eat, record the following in your food log as soon as possible:  What you ate. Be sure to include toppings, sauces, and other extras on the food.  How much you ate. This can be measured in cups, ounces, or number of items.  How many calories were in each food and drink.  The total number of calories in the food you ate.  Keep your food log near you, such as in a pocket-sized notebook or on an susan or website on your mobile phone. Some programs will calculate calories for you and show you how many calories you have left to meet your daily goal.  What are some portion-control tips?  Know how many calories are in a serving. This will help you know how many servings you can have of a certain food.  Use a measuring cup to measure serving sizes. You could also try weighing out portions on a kitchen scale. With time, you will be able to estimate serving sizes for some foods.  Take time to put servings of different foods on your favorite plates or in your favorite bowls and cups so you know what a serving looks like.  Try not to eat straight from a food's packaging, such as from a bag or box. Eating straight from the package makes it hard to see how much you are eating and can lead to overeating. Put the amount you would like to eat in a cup or on a plate to make sure you are eating the right portion.  Use smaller plates, glasses, and bowls for smaller portions and to prevent  overeating.  Try not to multitask. For example, avoid watching TV or using your computer while eating. If it is time to eat, sit down at a table and enjoy your food. This will help you recognize when you are full. It will also help you be more mindful of what and how much you are eating.  What are tips for following this plan?  Reading food labels  Check the calorie count compared with the serving size. The serving size may be smaller than what you are used to eating.  Check the source of the calories. Try to choose foods that are high in protein, fiber, and vitamins, and low in saturated fat, trans fat, and sodium.  Shopping  Read nutrition labels while you shop. This will help you make healthy decisions about which foods to buy.  Pay attention to nutrition labels for low-fat or fat-free foods. These foods sometimes have the same number of calories or more calories than the full-fat versions. They also often have added sugar, starch, or salt to make up for flavor that was removed with the fat.  Make a grocery list of lower-calorie foods and stick to it.  Cooking  Try to cook your favorite foods in a healthier way. For example, try baking instead of frying.  Use low-fat dairy products.  Meal planning  Use more fruits and vegetables. One-half of your plate should be fruits and vegetables.  Include lean proteins, such as chicken, turkey, and fish.  Lifestyle  Each week, aim to do one of the followin minutes of moderate exercise, such as walking.  75 minutes of vigorous exercise, such as running.  General information  Know how many calories are in the foods you eat most often. This will help you calculate calorie counts faster.  Find a way of tracking calories that works for you. Get creative. Try different apps or programs if writing down calories does not work for you.  What foods should I eat?    Eat nutritious foods. It is better to have a nutritious, high-calorie food, such as an avocado, than a food with  few nutrients, such as a bag of potato chips.  Use your calories on foods and drinks that will fill you up and will not leave you hungry soon after eating.  Examples of foods that fill you up are nuts and nut butters, vegetables, lean proteins, and high-fiber foods such as whole grains. High-fiber foods are foods with more than 5 g of fiber per serving.  Pay attention to calories in drinks. Low-calorie drinks include water and unsweetened drinks.  The items listed above may not be a complete list of foods and beverages you can eat. Contact a dietitian for more information.  What foods should I limit?  Limit foods or drinks that are not good sources of vitamins, minerals, or protein or that are high in unhealthy fats. These include:  Candy.  Other sweets.  Sodas, specialty coffee drinks, alcohol, and juice.  The items listed above may not be a complete list of foods and beverages you should avoid. Contact a dietitian for more information.  How do I count calories when eating out?  Pay attention to portions. Often, portions are much larger when eating out. Try these tips to keep portions smaller:  Consider sharing a meal instead of getting your own.  If you get your own meal, eat only half of it. Before you start eating, ask for a container and put half of your meal into it.  When available, consider ordering smaller portions from the menu instead of full portions.  Pay attention to your food and drink choices. Knowing the way food is cooked and what is included with the meal can help you eat fewer calories.  If calories are listed on the menu, choose the lower-calorie options.  Choose dishes that include vegetables, fruits, whole grains, low-fat dairy products, and lean proteins.  Choose items that are boiled, broiled, grilled, or steamed. Avoid items that are buttered, battered, fried, or served with cream sauce. Items labeled as crispy are usually fried, unless stated otherwise.  Choose water, low-fat milk,  unsweetened iced tea, or other drinks without added sugar. If you want an alcoholic beverage, choose a lower-calorie option, such as a glass of wine or light beer.  Ask for dressings, sauces, and syrups on the side. These are usually high in calories, so you should limit the amount you eat.  If you want a salad, choose a garden salad and ask for grilled meats. Avoid extra toppings such as avila, cheese, or fried items. Ask for the dressing on the side, or ask for olive oil and vinegar or lemon to use as dressing.  Estimate how many servings of a food you are given. Knowing serving sizes will help you be aware of how much food you are eating at restaurants.  Where to find more information  Centers for Disease Control and Prevention: www.cdc.gov  U.S. Department of Agriculture: myplate.gov  Summary  Calorie counting means keeping track of how many calories you eat and drink each day. If you eat fewer calories than your body needs, you should lose weight.  A healthy amount of weight to lose per week is usually 1-2 lb (0.5-0.9 kg). This usually means reducing your daily calorie intake by 500-750 calories.  The number of calories in a food can be found on a Nutrition Facts label. If a food does not have a Nutrition Facts label, try to look up the calories online or ask your dietitian for help.  Use smaller plates, glasses, and bowls for smaller portions and to prevent overeating.  Use your calories on foods and drinks that will fill you up and not leave you hungry shortly after a meal.  This information is not intended to replace advice given to you by your health care provider. Make sure you discuss any questions you have with your health care provider.  Document Revised: 01/28/2021 Document Reviewed: 01/28/2021  [employer] Patient Education © 2023 [employer] Exercising to Stay Healthy  To become healthy and stay healthy, it is recommended that you do moderate-intensity and vigorous-intensity exercise. You can tell  that you are exercising at a moderate intensity if your heart starts beating faster and you start breathing faster but can still hold a conversation. You can tell that you are exercising at a vigorous intensity if you are breathing much harder and faster and cannot hold a conversation while exercising.  How can exercise benefit me?  Exercising regularly is important. It has many health benefits, such as:  Improving overall fitness, flexibility, and endurance.  Increasing bone density.  Helping with weight control.  Decreasing body fat.  Increasing muscle strength and endurance.  Reducing stress and tension, anxiety, depression, or anger.  Improving overall health.  What guidelines should I follow while exercising?  Before you start a new exercise program, talk with your health care provider.  Do not exercise so much that you hurt yourself, feel dizzy, or get very short of breath.  Wear comfortable clothes and wear shoes with good support.  Drink plenty of water while you exercise to prevent dehydration or heat stroke.  Work out until your breathing and your heartbeat get faster (moderate intensity).  How often should I exercise?  Choose an activity that you enjoy, and set realistic goals. Your health care provider can help you make an activity plan that is individually designed and works best for you.  Exercise regularly as told by your health care provider. This may include:  Doing strength training two times a week, such as:  Lifting weights.  Using resistance bands.  Push-ups.  Sit-ups.  Yoga.  Doing a certain intensity of exercise for a given amount of time. Choose from these options:  A total of 150 minutes of moderate-intensity exercise every week.  A total of 75 minutes of vigorous-intensity exercise every week.  A mix of moderate-intensity and vigorous-intensity exercise every week.  Children, pregnant women, people who have not exercised regularly, people who are overweight, and older adults may need to  talk with a health care provider about what activities are safe to perform. If you have a medical condition, be sure to talk with your health care provider before you start a new exercise program.  What are some exercise ideas?  Moderate-intensity exercise ideas include:  Walking 1 mile (1.6 km) in about 15 minutes.  Biking.  Hiking.  Golfing.  Dancing.  Water aerobics.  Vigorous-intensity exercise ideas include:  Walking 4.5 miles (7.2 km) or more in about 1 hour.  Jogging or running 5 miles (8 km) in about 1 hour.  Biking 10 miles (16.1 km) or more in about 1 hour.  Lap swimming.  Roller-skating or in-line skating.  Cross-country skiing.  Vigorous competitive sports, such as football, basketball, and soccer.  Jumping rope.  Aerobic dancing.  What are some everyday activities that can help me get exercise?  Yard work, such as:  Pushing a .  Raking and bagging leaves.  Washing your car.  Pushing a stroller.  Shoveling snow.  Gardening.  Washing windows or floors.  How can I be more active in my day-to-day activities?  Use stairs instead of an elevator.  Take a walk during your lunch break.  If you drive, park your car farther away from your work or school.  If you take public transportation, get off one stop early and walk the rest of the way.  Stand up or walk around during all of your indoor phone calls.  Get up, stretch, and walk around every 30 minutes throughout the day.  Enjoy exercise with a friend. Support to continue exercising will help you keep a regular routine of activity.  Where to find more information  You can find more information about exercising to stay healthy from:  U.S. Department of Health and Human Services: www.hhs.gov  Centers for Disease Control and Prevention (CDC): www.cdc.gov  Summary  Exercising regularly is important. It will improve your overall fitness, flexibility, and endurance.  Regular exercise will also improve your overall health. It can help you control your weight,  reduce stress, and improve your bone density.  Do not exercise so much that you hurt yourself, feel dizzy, or get very short of breath.  Before you start a new exercise program, talk with your health care provider.  This information is not intended to replace advice given to you by your health care provider. Make sure you discuss any questions you have with your health care provider.  Document Revised: 04/15/2022 Document Reviewed: 04/15/2022  Elsevier Patient Education © 2023 [employer]

## 2024-03-28 PROBLEM — L98.9 BENIGN SKIN LESION OF THIGH: Status: RESOLVED | Noted: 2024-03-28 | Resolved: 2024-03-28

## 2024-03-28 PROBLEM — L98.9 SKIN LESION OF LEFT LEG: Status: ACTIVE | Noted: 2024-03-28

## 2024-03-28 PROBLEM — L98.9 BENIGN SKIN LESION OF THIGH: Status: ACTIVE | Noted: 2024-03-28

## 2024-03-28 PROBLEM — F41.9 ANXIETY: Status: ACTIVE | Noted: 2024-03-28

## 2024-03-29 NOTE — ASSESSMENT & PLAN NOTE
He is compliant with C-pap nightly and tolerating well.  He denies daytime sleepiness.  Encouraged continued nightly use.  He is followed by Dr. Chery, Sleep Medicine.

## 2024-03-29 NOTE — ASSESSMENT & PLAN NOTE
C/O increased anxiety due to job loss.  Denies SI/HI.  Advised of National Suicide Hotline no 608.  Discussed importance of referral to psychiatry and therapy and he declined.  Advised patient if he changed his mind about psychiatry referral, he could send me a message through [employer] and I will submit referral to psychiatry for him.

## 2024-03-29 NOTE — ASSESSMENT & PLAN NOTE
Controlled on Metoprolol Succ 50mg daily.  Continue current treatment plan.  Will continue monitor.

## 2024-03-29 NOTE — ASSESSMENT & PLAN NOTE
Last A1c 7.3 in 3/2024.  Encouraged to decrease sugar/carbs and increase exercise daily.  He is followed by Dr. Spivey, Endocrinology.

## 2024-03-29 NOTE — ASSESSMENT & PLAN NOTE
Asymptomatic.  Controlled on Metoprolol Succ 50mg daily.  Continue current treatment plan.  Will continue monitor.

## 2024-03-29 NOTE — ASSESSMENT & PLAN NOTE
Hypertension is stable.  Decrease table salt and foods high in salt.  Weight loss.  Increase activity daily.  Continue Lisinopril-HCTZ 20-12.5mg daily & Metoprolol Succ 50mg daily.  Blood pressure will be re-assessed in 3 months.

## 2024-04-01 ENCOUNTER — PATIENT MESSAGE (OUTPATIENT)
Dept: FAMILY MEDICINE CLINIC | Facility: CLINIC | Age: 59
End: 2024-04-01
Payer: COMMERCIAL

## 2024-04-01 ENCOUNTER — TELEPHONE (OUTPATIENT)
Dept: FAMILY MEDICINE CLINIC | Facility: CLINIC | Age: 59
End: 2024-04-01

## 2024-04-01 NOTE — TELEPHONE ENCOUNTER
"  Caller: Angelito Hart \"ANA\"    Relationship: Self    Best call back number: [phone]       What was the call regarding: PATIENT STATES HIS ANXIETY HAS RETURNED AND IT IS PERSISTENT, CONSTANT, AND LONG LASTING. HE STATES DUE TO A JOB LOSS IT HAS RETURNED. SHE WOULD LIKE TO SPEAK TO EMERITA HINSON ABOUT THIS ASAP     PLEASE CALL AND ADVISE   "

## 2024-04-01 NOTE — TELEPHONE ENCOUNTER
"Caller: Angelito Hart \"ANA\"    Relationship to patient: Self    Best call back number: [phone]    Patient is needing: PATIENT WAS CALLING BACK LOOKING FOR AN UPDATE AS HE WAS REALLY WANTING TO SPEAK WITH ERNESTINE HINSON AS THE ANXIETY IS PRETTY BAD OR IF THERE IS ANYTHING THAT CAN BE CALLED IN TO HELP HIM IMMEDIATELY AS WELL AS SOMETHING LONG TERM.PATIENT'S MOTHER HAS SIMILAR ISSUES AND WAS PRESCRIBED TRINTELLIX AND WANTS TO KNOW IF THAT MAY BE A POSSIBILITY FOR HIM.PATIENT REQUESTING CALLBACK WITH UPDATES.    PREFERRED PHARMACY:[facility] [id number] - [place], [place] - [address] AT [facility] [address] [facility] - [phone] [facility] [phone]  [phone]       "

## 2024-04-02 NOTE — TELEPHONE ENCOUNTER
From: Angelito Hart  To: Anne Zavala  Sent: 4/1/2024 5:33 PM EDT  Subject: Tone Rodríguez    My anxiety disorder has increased since my office visit last week. I left a couple of messages for a call back please, I really appreciate it. Much thanks.

## 2024-04-02 NOTE — TELEPHONE ENCOUNTER
Called and spoke to patient and advised him that I recommend he be seen by Behavioral Health or Psychiatry for evaluation and treatment of increasing anxiety, with long standing history of anxiety.  Patient also indicated that he has sleep disturbance and history of admission to [facility].  Patient agreed to be referred to [facility].  I advised patient that I spoke to person at [facility] and she indicated that the patient already has a referral in the system and was scheduled to be seen on 3/28/24 (visit cancelled by patient).  Advised patient that person at [facility] will call him today and schedule him for another appointment and he agreed.    Spoke to Kyra Darling, Referral Coordinator, at [phone], who indicated she will call patient today to schedule him for an appointment.

## 2024-04-02 NOTE — TELEPHONE ENCOUNTER
I called patient to check in on him. He said he need to talk to you about his severe anxiety and he has been having trouble sleeping in the past 2 days. Patient stated his mom sees Dr. Lantigua and he prescribe SSRI to help his mom with anxiety and was wondering if this something that you would recommend. He stated this is not a controlled substance. I also told the patient he can go to the Urgent care or ER since the provider is seeing patients at this time, but he rather wait until provider is available. Please advise.

## 2024-04-02 NOTE — TELEPHONE ENCOUNTER
"  Caller: Angelito Hart \"ANA\"    Relationship: Self    Best call back number:[phone]  What is the best time to reach you: ANY     Who are you requesting to speak with (clinical staff, provider,  specific staff member): CLINICAL STAFF       What was the call regarding: INCREASE IN ANXIETY     I  "

## 2024-04-05 ENCOUNTER — TELEPHONE (OUTPATIENT)
Dept: CARDIOLOGY | Facility: CLINIC | Age: 59
End: 2024-04-05
Payer: COMMERCIAL

## 2024-04-05 DIAGNOSIS — I10 PRIMARY HYPERTENSION: Chronic | ICD-10-CM

## 2024-04-05 DIAGNOSIS — E78.2 MIXED HYPERLIPIDEMIA: Chronic | ICD-10-CM

## 2024-04-05 DIAGNOSIS — Z82.49 FAMILY HISTORY OF PREMATURE CAD: Chronic | ICD-10-CM

## 2024-04-05 DIAGNOSIS — I49.3 FREQUENT PVCS: Chronic | ICD-10-CM

## 2024-04-05 RX ORDER — METOPROLOL SUCCINATE 50 MG/1
50 TABLET, EXTENDED RELEASE ORAL DAILY
Qty: 90 TABLET | Refills: 3 | Status: SHIPPED | OUTPATIENT
Start: 2024-04-05 | End: 2024-04-05

## 2024-04-05 RX ORDER — METOPROLOL SUCCINATE 50 MG/1
50 TABLET, EXTENDED RELEASE ORAL DAILY
Qty: 90 TABLET | Refills: 0 | Status: SHIPPED | OUTPATIENT
Start: 2024-04-05

## 2024-04-05 NOTE — TELEPHONE ENCOUNTER
Pt has called and LVM for a medication refill, for Metoprolol 50mg    Pt states he just received health insurance - through spouses job, and he would like it sent to the [facility] pharmacy.     Pt advised to make a follow up appt with Dr. Alvarado as he has not been seen for 2 years.     Pt stated that Dr. Alvarado told him he did not have to be seen anymore.     Pt advised he will need to be seen to continue to receive refills.   Pt verbalized understanding.    Pt was given a 90 day until he is able to make an appt with Dr. Alvarado

## 2024-04-09 NOTE — TELEPHONE ENCOUNTER
Pt advised per Dr. Alvarado, Pt is not wanting to schedule with Dr. Alvarado and spend money. Pt states he will try his PCP and verbalized understanding.

## 2024-04-11 ENCOUNTER — TELEMEDICINE (OUTPATIENT)
Dept: PSYCHIATRY | Facility: CLINIC | Age: 59
End: 2024-04-11
Payer: COMMERCIAL

## 2024-04-11 DIAGNOSIS — F41.1 GAD (GENERALIZED ANXIETY DISORDER): Primary | ICD-10-CM

## 2024-04-11 DIAGNOSIS — G47.09 OTHER INSOMNIA: ICD-10-CM

## 2024-04-11 RX ORDER — CHOLECALCIFEROL (VITAMIN D3) 125 MCG
5 CAPSULE ORAL NIGHTLY
COMMUNITY

## 2024-04-11 RX ORDER — TRAZODONE HYDROCHLORIDE 50 MG/1
50-100 TABLET ORAL NIGHTLY PRN
Qty: 180 TABLET | Refills: 0 | Status: SHIPPED | OUTPATIENT
Start: 2024-04-11 | End: 2024-07-10

## 2024-04-11 NOTE — PROGRESS NOTES
"This provider is located at the [facility] (through [facility]), [address], [facility], [address] using a secure Chat& (ChatAnd)hart Video Visit through [employer]. Patient is being seen remotely via telehealth at their home address in [place], and stated they are in a secure environment for this session. The patient's condition being diagnosed/treated is appropriate for telemedicine. The provider identified herself as well as her credentials.   The patient, and/or patients guardian, consent to be seen remotely, and when consent is given they understand that the consent allows for patient identifiable information to be sent to a third party as needed.   They may refuse to be seen remotely at any time. The electronic data is encrypted and password protected, and the patient and/or guardian has been advised of the potential risks to privacy not withstanding such measures.    You have chosen to receive care through a telehealth visit.  Do you consent to use a video/audio connection for your medical care today? Yes    Patient identifiers utilized: Name and date of birth.    Patient verbally confirmed consent for today's encounter:  April 11, 2024    Subjective   Angelito Hart is a 58 y.o. male who presents today for initial evaluation     Chief Complaint:  No chief complaint on file.       History of Present Illness:    History of Present Illness  Liu is a 57 y/o male seen to establish outpatient behavioral health services for anxiety and insomnia. States the recent trigger was the loss of a job as his role was eliminated. States his adult son still lives at home, is in college and is dependent on the patient financially. Patient states he has been taking melatonin with little relief in staying asleep and promethazine DM (double dose) at night and states \"this helped with my anxiety and sleep\". States he has been exercising at least 15 minutes per day. States this has helped with " "internal restlessness and shakiness. Thinks the promethazine has played a big role in this. Educated patient promethazine is an antiemetic and not appropriate to be utilized to treat anxiety/depression/insomnia, however, the DM ingredient-dextromethorphan is currently available in prescription medication Auvelity. Treatment with dextromethorphan-bupropion has been found to be safe and effective for anxiety among patients with major depressive disorder (MDD) and a history of treatment failure. States his anxiety is mainly related to irrational fears and worries. States he believes anxiety will subside once he finds a job. States he would love to work on site again, as he does not like remote work. 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     Medical history: T2 DM, HTN, HLD, SOHAM, PVCs    Illicit substance use: denies     ETOH: denies     Tobacco/vape: denies     Psychosocial history:     Born:  [place], [place]     Raised by:  parents    Siblings:  1 sister    Describes childhood as: normal    Abuse history:     \"           Physical: denies     \"           Emotional: denies     \"           Mental: denies     \"           Sexual:  denies     \"           Rape: denies     Highest education level achieved:  bachelors of science    Work history:  IT x 37 years     history: denies     Legal history/previous chargers or incarcerations: denies     Marital status:      Number of children: 1 son    Self-harming behaviors: denies     Impulsive or risky behavior:  denies    Suicidal attempts: denies       Family history of suicide attempts/completion: denies     Social support:  spouse, son    Previous diagnoses:  depression, anxiety, patient states he was inaccurately diagnosed with ADHD,     Inpatient psychiatric admissions:  [facility] 2011 related to withdrawal from benzos/stimulants    History of psychotherapy:  " "denies    History of behavioral health treatment: previously with Dr. Angelito Estevez at [facility]    Medications trialed: Zoloft-made the back of his head and neck shake, Paxil, Effexor (turned me into an emotional zombie) , states he was prescribed Adderall/Ritalin maxed out dosing combined with Xanax, \"states psychiatrist told him this combo was \"speed balling\" and \"nice to have\" as patient was taking night classes at that time and working, states \"I do not have ADHD\".    Family mental health: mother/father-anxiety, depression      Symptom burden:     Sleep: interrupted, poor quality, average 6-7 hours    Appetite:  eats well    Anxiety: inner restlessness, shakiness, rumination    Paranoia: denies     Hallucinations: denies     Mood fluctuations/irritability: denies     ADITYA 7 score 12  PHQ 9 score 15     The following portions of the patient's history were reviewed and updated as appropriate: allergies, current medications, past family history, past medical history, past social history, past surgical history and problem list.    Past Psychiatric History:  Began Treatment: early     Diagnoses:Depression and Anxiety    Psychiatrist: Dr. Angelito Estevez at [facility] psychiatrist    Therapist:Denies    Admission History: [facility]  r/t withdrawal from benzos/stimulants    Medication Trials: zoloft-\"made the back of his head and neck shake\", paxil and effexor-\"turned me into an emotional zombie\", xanax, adderall and ritalin    Self Harm: Denies    Suicide Attempts:Denies     Psychosis, Anxiety, Depression:  n/a    Past Medical History:  Past Medical History:   Diagnosis Date    Abnormal ECG 2017    PCP ARNP in-office EKG, irregular beat, not concerned    Anxiety     First occurrence about 20 years ago, during periods of unemployment    Arrhythmia 2017    PCP ARNP in-office EKG, irregular beat, not concerned    Depression     Diabetes mellitus     TYPE 2    Family history of " premature CAD 2018    History of medical problems     Sleep Apnea - using CPAP    Hypercalcemia     Hyperlipidemia     Hypertension     Hypogonadism in male     Increased PTH level     Kidney stones     Obesity     Sleep apnea     uses cpap       Substance Abuse History:   Types:Denies all, including illicit  Withdrawal Symptoms:Denies  Longest Period Sober:Not Applicable   AA: Not applicable     Social History:  Social History     Socioeconomic History    Marital status:    Tobacco Use    Smoking status: Former     Current packs/day: 0.00     Average packs/day: 0.5 packs/day for 21.5 years (10.7 ttl pk-yrs)     Types: Cigarettes     Start date: 1998     Quit date: 10/27/2019     Years since quittin.4     Passive exposure: Past    Smokeless tobacco: Never    Tobacco comments:     after 21 years, I quit on 10/27/19   Vaping Use    Vaping status: Never Used   Substance and Sexual Activity    Alcohol use: No     Comment: Caffeine use    Drug use: No    Sexual activity: Not Currently     Partners: Female     Birth control/protection: Abstinence       Family History:  Family History   Problem Relation Age of Onset    Diabetes Mother         Type 2    Arrhythmia Mother         got pacemaker 2 years ago to regulate or something like that - living, about age 80    Anxiety disorder Mother     Depression Mother     Heart attack Father 54        quadruple bypass, diabetic neuropathy, amputations,  age 54    Heart disease Father 54        quadruple bypass    Diabetes Father         Type 2    Sudden death Father     Depression Father     Heart failure Maternal Uncle     Diabetes Maternal Grandmother         Type 2    Heart attack Paternal Grandfather         death from heart attack when my father was a child    Diabetes Son         Type 1    Heart failure Maternal Uncle          age 64    Malig Hyperthermia Neg Hx        Past Surgical History:  Past Surgical History:   Procedure Laterality Date     BRANCHIAL CLEFT CYST EXCISION      COLONOSCOPY N/A 05/26/2017    Procedure: COLONOSCOPY INTO TERMINAL ILEUM WITH COLD BIOPSY POLYPECTOMIES;  Surgeon: Kelby Winter MD;  Location: [facility] ENDOSCOPY;  Service:     CYSTOSCOPY      2007    EXTRACORPOREAL SHOCK WAVE LITHOTRIPSY (ESWL) Left 09/15/2017    Procedure: EXTRACORPOREAL SHOCKWAVE LITHOTRIPSY WITH CYSTOSCOPY AND STENT PLACEMENT AND LEFT UTEROSCOPY;  Surgeon: Otilio Mcgrath MD;  Location: [facility] OR OSC;  Service:     EXTRACORPOREAL SHOCK WAVE LITHOTRIPSY (ESWL) Left 10/11/2019    Procedure: EXTRACORPOREAL SHOCKWAVE LITHOTRIPSY LEFT;  Surgeon: Otilio Mcgrath MD;  [facility];  Service: Urology    EXTRACORPOREAL SHOCK WAVE LITHOTRIPSY (ESWL) Left 03/17/2023    Procedure: EXTRACORPOREAL SHOCKWAVE LITHOTRIPSY;  Surgeon: Arun Potts MD;  Location: [facility] MAIN OR;  Service: Urology;  Laterality: Left;    EYE SURGERY      LASIK    HERNIA REPAIR      As a child    TONSILLECTOMY      URETEROSCOPY LASER LITHOTRIPSY WITH STENT INSERTION Left 01/24/2020    Procedure: CYSROSCOPY URETEROSCOPY LASER LITHOTRIPSY STONE BASKET EXTRACTION STENT WITH STRING;  Surgeon: Josse Garcia MD;  Location: [facility] MAIN OR;  Service: Urology       Problem List:  Patient Active Problem List   Diagnosis    Controlled type 2 diabetes mellitus without complication    Hypertension    Hyperlipidemia    Obstructive sleep apnea syndrome    Ureteral calculus, left    Family history of premature CAD    Renal calculus, left    Class 2 obesity due to excess calories without serious comorbidity with body mass index (BMI) of 36.0 to 36.9 in adult    PVC (premature ventricular contraction)    Vitamin D deficiency    Frequent PVCs    Anxiety    Skin lesion of left leg       Allergy:   Allergies   Allergen Reactions    Penicillins Other (See Comments)     AS CHILD    Sertraline Hcl Other (See Comments)     skaking        Current Medications:   Current Outpatient Medications    Medication Sig Dispense Refill    aspirin 81 MG EC tablet Take 1 tablet by mouth Daily.      Empagliflozin (JARDIANCE) 25 MG tablet Take 1 tablet by mouth Daily.      glimepiride (AMARYL) 4 MG tablet Take 1 tablet by mouth.      icosapent ethyl (VASCEPA) 1 g capsule capsule Take 2 g by mouth.      lisinopril-hydrochlorothiazide (PRINZIDE,ZESTORETIC) 20-12.5 MG per tablet Take 1 tablet by mouth Daily.      melatonin 5 MG tablet tablet Take 1 tablet by mouth Every Night.      metFORMIN (GLUCOPHAGE) 1000 MG tablet Take 1 tablet by mouth 2 (Two) Times a Day With Meals.      metoprolol succinate XL (TOPROL-XL) 50 MG 24 hr tablet Take 1 tablet by mouth Daily. 90 tablet 0    Semaglutide 14 MG tablet Take 1 tablet by mouth Daily.      simvastatin (ZOCOR) 40 MG tablet Take 1 tablet by mouth Every Night.      SITagliptin (JANUVIA) 100 MG tablet Take 1 tablet by mouth Daily.      vitamin D (ERGOCALCIFEROL) 1.25 MG (71897 UT) capsule capsule Take 1 capsule by mouth Every 7 (Seven) Days. SATURDAYS      traZODone (DESYREL) 50 MG tablet Take 1-2 tablets by mouth At Night As Needed for Sleep for up to 90 days. 180 tablet 0     No current facility-administered medications for this visit.       Review of Systems:    Review of Systems   Psychiatric/Behavioral:  Positive for sleep disturbance. The patient is nervous/anxious.    All other systems reviewed and are negative.        Physical Exam:   Physical Exam  Constitutional:       General: He is awake.      Appearance: Normal appearance. He is well-developed, well-groomed and overweight.   Neurological:      Mental Status: He is alert and oriented to person, place, and time.   Psychiatric:         Attention and Perception: Attention and perception normal.         Mood and Affect: Mood and affect normal.         Speech: Speech normal.         Behavior: Behavior normal. Behavior is cooperative.         Thought Content: Thought content normal.         Cognition and Memory: Cognition  and memory normal.         Judgment: Judgment normal.         Vitals:  There were no vitals taken for this visit. There is no height or weight on file to calculate BMI.  Due to extenuating circumstances and possible current health risks associated with the patient being present in a clinical setting (with current health restrictions in place in regards to possible COVID 19 transmission/exposure), the patient was seen remotely today via a MyChart Video Visit through [facility].  Unable to obtain vital signs due to nature of remote visit.  Height stated at 70 inches.  Weight stated at 254 pounds.    Last 3 Blood Pressure Readings:  BP Readings from Last 3 Encounters:   03/27/24 132/88   03/12/24 137/81   04/24/23 140/78       PHQ-9 Score:   PHQ-9 Total Score:      ADITYA-7 Score:   Feeling nervous, anxious or on edge: (P) Nearly every day  Not being able to stop or control worrying: (P) Several days  Worrying too much about different things: (P) Nearly every day  Trouble Relaxing: (P) Several days  Being so restless that it is hard to sit still: (P) Several days  Feeling afraid as if something awful might happen: (P) Nearly every day  Becoming easily annoyed or irritable: (P) Not at all  ADITYA 7 Total Score: (P) 12  If you checked any problems, how difficult have these problems made it for you to do your work, take care of things at home, or get along with other people: (P) Very difficult     Mental Status Exam:   Hygiene:   good  Cooperation:  Cooperative  Eye Contact:  Good  Psychomotor Behavior:  Appropriate  Affect:  Appropriate  Mood: normal  Hopelessness: Optimistic  Speech:  Normal  Thought Process:  Goal directed and Linear  Thought Content:  Mood congruent  Suicidal:  None  Homicidal:  None  Hallucinations:  None  Delusion:  None  Memory:  Intact  Orientation:  Person, Place, Time, and Situation  Reliability:  good  Insight:  Good  Judgement:  Good  Impulse Control:  Good  Physical/Medical Issues:  Yes T2 DM, HTN, HLD,  SOHAM, PVCs        Lab Results:   No visits with results within 6 Month(s) from this visit.   Latest known visit with results is:   Admission on 03/17/2023, Discharged on 03/17/2023   Component Date Value Ref Range Status    Glucose 03/17/2023 257 (H)  70 - 130 mg/dL Final    Meter: [id number] : [id number] Agustín DELGADO    Glucose 03/17/2023 197 (H)  70 - 130 mg/dL Final    Meter: [id number] : [id number] Mora Sanchez RN     Assessment & Plan   Assessment     ICD-10-CM ICD-9-CM   1. ADITYA (generalized anxiety disorder)  F41.1 300.02   2. Other insomnia  G47.09 780.52         Start Trazodone  mg at bedtime if needed. Patient educated on potential side effects of medication. He gave verbal consent to treat.     Patient cautious about initiating SSRI/SNRI therapy due to potential side effects. He has had a positive response to dextromethorphan and he is educated he can buy this supplement over the counter. Treatment with dextromethorphan-bupropion has been found to be safe and effective for anxiety among patients with major depressive disorder (MDD) and a history of treatment failure. Dextromethorphan may benefit cognition and anxiety associated with depression.    Patient provided number to the [phone] to make an appointment should he decide he would like to try SSRI/SNRI therapy.     GOALS:  Short Term Goals: Patient will be compliant with medication, and patient will have no significant medication related side effects.  Patient will be engaged in psychotherapy as indicated.  Patient will report subjective improvement of symptoms.  Long term goals: To stabilize mood and treat/improve subjective symptoms, the patient will stay out of the hospital, the patient will be at an optimal level of functioning, and the patient will take all medications as prescribed.  The patient/guardian verbalized understanding and agreement with goals that were mutually set.      TREATMENT PLAN: Continue  supportive psychotherapy efforts and medications as indicated.  Pharmacological and Non-Pharmacological treatment options discussed during today's visit. Patient/Guardian acknowledged and verbally consented with current treatment plan and was educated on the importance of compliance with treatment and follow-up appointments.      MEDICATION ISSUES:  Discussed medication options and treatment plan of prescribed medication as well as the risks, benefits, any black box warnings, and side effects including potential falls, possible impaired driving, and metabolic adversities among others. Patient is agreeable to call the office with any worsening of symptoms or onset of side effects, or if any concerns or questions arise.  The contact information for the office is made available to the patient. Patient is agreeable to call 911 or go to the nearest ER should they begin having any SI/HI, or if any urgent concerns arise. No medication side effects or related complaints today.     Medication Changes During Visit:   There are no discontinued medications.   New Medications Ordered This Visit   Medications    traZODone (DESYREL) 50 MG tablet     Sig: Take 1-2 tablets by mouth At Night As Needed for Sleep for up to 90 days.     Dispense:  180 tablet     Refill:  0       Follow Up Appointment:   Return if symptoms worsen or fail to improve, for Recheck.           This document has been electronically signed by ERNESTINE Mathis  April 11, 2024 12:05 EDT    Dictated Utilizing Dragon Dictation: Part of this note may be an electronic transcription/translation of spoken language to printed text using the Dragon Dictation System.

## 2024-06-14 DIAGNOSIS — G47.09 OTHER INSOMNIA: ICD-10-CM

## 2024-06-14 NOTE — TELEPHONE ENCOUNTER
Patient called and knows that the prescription isn't due until July 9, 2024 but he would like to know if provider can send a script in to go through on July 8, 2024 since it is mail order.  Please advise. This is in regards to his trazadone.

## 2024-06-17 ENCOUNTER — TELEPHONE (OUTPATIENT)
Dept: PSYCHIATRY | Facility: CLINIC | Age: 59
End: 2024-06-17
Payer: COMMERCIAL

## 2024-06-17 DIAGNOSIS — F41.1 GAD (GENERALIZED ANXIETY DISORDER): Primary | ICD-10-CM

## 2024-06-17 DIAGNOSIS — G47.09 OTHER INSOMNIA: Primary | ICD-10-CM

## 2024-06-17 RX ORDER — TRAZODONE HYDROCHLORIDE 50 MG/1
50-100 TABLET ORAL NIGHTLY PRN
Qty: 180 TABLET | Refills: 0 | Status: SHIPPED | OUTPATIENT
Start: 2024-06-17 | End: 2024-06-17

## 2024-06-17 RX ORDER — TRAZODONE HYDROCHLORIDE 50 MG/1
25-50 TABLET ORAL DAILY
Qty: 90 TABLET | Refills: 0 | Status: SHIPPED | OUTPATIENT
Start: 2024-06-17 | End: 2024-09-16

## 2024-06-17 RX ORDER — TRAZODONE HYDROCHLORIDE 50 MG/1
25-50 TABLET ORAL DAILY
Qty: 90 TABLET | Refills: 0 | Status: SHIPPED | OUTPATIENT
Start: 2024-06-17 | End: 2024-06-17 | Stop reason: DRUGHIGH

## 2024-06-17 RX ORDER — TRAZODONE HYDROCHLORIDE 100 MG/1
100-200 TABLET ORAL NIGHTLY
Qty: 180 TABLET | Refills: 0 | Status: SHIPPED | OUTPATIENT
Start: 2024-06-17

## 2024-06-17 NOTE — TELEPHONE ENCOUNTER
Patient would like to know if he can still take the 50mg during the day as well as the 200mg at night.  Please advise.

## 2024-06-17 NOTE — TELEPHONE ENCOUNTER
Patient has been made aware. Patient feels that his sleep issues is an anxiety issues in regards to job loss in his late 50s and the uncertainty of the future not an issue for the sleep specialist and wanted to know if the provider can increase the dosage to 2 100mg pills at night. Patient states he has never had this issue until he lost his job. Patient is very adamant about this.  Please advise.

## 2024-06-17 NOTE — TELEPHONE ENCOUNTER
Patient called stating he would like to increase the dosage of his trazadone to maybe 2 a night.  Patient states he is only sleeping 1-2 hours a night.  Patient would also like to know if provider can prescribe him 1 to take throughout the day.  Patient states it helps with his anxiety during the day.  Please advise.

## 2024-06-17 NOTE — TELEPHONE ENCOUNTER
The prescription sent for sleep allows for 2 tablets per night. I will send in a daily dose as well to assist with anxiety. Any further issues with insomnia, need to be address with his sleep medicine specialist.

## 2024-07-14 DIAGNOSIS — Z82.49 FAMILY HISTORY OF PREMATURE CAD: Chronic | ICD-10-CM

## 2024-07-14 DIAGNOSIS — I10 PRIMARY HYPERTENSION: Chronic | ICD-10-CM

## 2024-07-14 DIAGNOSIS — E78.2 MIXED HYPERLIPIDEMIA: Chronic | ICD-10-CM

## 2024-07-14 DIAGNOSIS — I49.3 FREQUENT PVCS: Chronic | ICD-10-CM

## 2024-07-15 RX ORDER — METOPROLOL SUCCINATE 50 MG/1
50 TABLET, EXTENDED RELEASE ORAL DAILY
Qty: 90 TABLET | Refills: 0 | Status: SHIPPED | OUTPATIENT
Start: 2024-07-15

## 2024-07-15 NOTE — TELEPHONE ENCOUNTER
Rx Refill Note  Requested Prescriptions     Pending Prescriptions Disp Refills    metoprolol succinate XL (TOPROL-XL) 50 MG 24 hr tablet 90 tablet 0     Sig: Take 1 tablet by mouth Daily.      Last office visit with prescribing clinician: 12/9/2022   Last telemedicine visit with prescribing clinician: Visit date not found   Next office visit with prescribing clinician: Visit date not found                         Would you like a call back once the refill request has been completed: [] Yes [] No    If the office needs to give you a call back, can they leave a voicemail: [] Yes [] No    Kyra Strange CMA  07/15/24, 10:59 EDT

## 2024-08-27 ENCOUNTER — TELEMEDICINE (OUTPATIENT)
Dept: PSYCHIATRY | Facility: CLINIC | Age: 59
End: 2024-08-27
Payer: COMMERCIAL

## 2024-08-27 DIAGNOSIS — F41.1 GAD (GENERALIZED ANXIETY DISORDER): Primary | Chronic | ICD-10-CM

## 2024-08-27 DIAGNOSIS — G47.09 OTHER INSOMNIA: Chronic | ICD-10-CM

## 2024-08-27 PROCEDURE — 90792 PSYCH DIAG EVAL W/MED SRVCS: CPT | Performed by: NURSE PRACTITIONER

## 2024-08-27 RX ORDER — TRAZODONE HYDROCHLORIDE 100 MG/1
100-200 TABLET ORAL NIGHTLY
Qty: 180 TABLET | Refills: 0 | Status: SHIPPED | OUTPATIENT
Start: 2024-08-27

## 2024-08-27 RX ORDER — TRAZODONE HYDROCHLORIDE 50 MG/1
25-50 TABLET, FILM COATED ORAL DAILY PRN
Qty: 90 TABLET | Refills: 0 | Status: SHIPPED | OUTPATIENT
Start: 2024-08-27

## 2024-08-27 NOTE — PROGRESS NOTES
This provider is completing this appointment through [facility] (through [facility]), [address], [place], [address] using a secure [employer] Video Visit through [employer]. Patient is being seen remotely via telehealth in his vehicle in [place], and stated they are in a secure environment for this session. The patient's condition being diagnosed/treated is appropriate for telemedicine. The provider identified herself as well as her credentials.   The patient, and/or patients guardian, consent to be seen remotely, and when consent is given they understand that the consent allows for patient identifiable information to be sent to a third party as needed.   They may refuse to be seen remotely at any time. The electronic data is encrypted and password protected, and the patient and/or guardian has been advised of the potential risks to privacy not withstanding such measures.    You have chosen to receive care through a telehealth visit.  Do you consent to use a video/audio connection for your medical care today? Yes    Patient identifiers utilized: Name and date of birth.        Subjective   Angelito Hart is a 59 y.o. male who presents today for initial evaluation     Chief Complaint: Anxiety, sleep disruption    Accompanied by: Pt was alone for duration of appointment     History of Present Illness:   [facility]. She last saw pt on 4/11/24. She diagnosed pt with ADITYA and other insomnia. Pt reports since graduating college, he has had three positions dissolve. Each one came with it's own set of complications. 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 Pt feels the complication this time has been related to his age. 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 Pt does not have benefits at this  "time. Pt states job loss has been the core of his anxiety and sleep disruption. He was also concerned about health insurance coverage for his family. Per pt, his anxiety worsened when his wife received word in April that her position may also be eliminated. He states that threat was present for several months before dissipating around the time he obtained employment with [employer]. Pt has been able to switch the family over to his wife's health insurance plan. The patient endorses significant symptoms of anxiety including: excessive anxiety and worry about a number of events or activities for more days than not, being easily fatigued, difficulty concentrating or mind going blank, muscle tension, sleep disturbance, and feeling of impending doom which have caused impairment in important areas of daily functioning. The patient rates their anxiety on average in the past week at a 2-3/10 on a 0-10 scale, with 10 being the worst. Pt reports the Trazodone has been effective during the day. He tries not to take it every day, but when he does it keeps the anxiety at bay. Pt has SOHAM and he utilizes a CPAP. Pt is averaging 5.5-6 hours of sleep on the Trazodone. Pt would eventually like to taper off medication once he feels more secure in his job.     Current Psychiatric Medications:  Trazodone 25-50 mg PO Daily PRN for anxiety  Trazodone 100-200 mg PO QHS    Prior Psychiatric Medications:  Zoloft - head and neck shake  Paxil  Effexor - emotional zombie  Adderall   Ritalin  Xanax    *In regards to the Adderall, Ritalin, and Xanax, pt states he was prescribed Adderall/Ritalin and maxed out dosing combined with Xanax. Pt reports the psychiatrist prescribed him the combo, which people refer to as \"speed balling. Pt was taking was taking night classes at that time and working. Pt reports he does not have ADHD.*    Currently in Counseling or Therapy:  Denies    Prior Psychiatric Outpatient Care:  Pt is transferring from [facility], who has left the practice. She diagnosed pt with ADITYA and other insomnia. Pt also saw Dr. Angelito Estevez in the past at [facility].     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     Social History:  Born: [place], [place]  Marriage status:   Children: 1 son   Lives with: The patient's currently household consists of the patient, wife, son    Illicit Substance Use History:  Denies    Abuse History:  Psychological: Denies  Physical: Denies  Sexual: Denies  Other: Denies    Patient's Support Network Includes:  wife and son        The following portions of the patient's history were reviewed and updated as appropriate: allergies, current medications, past family history, past medical history, past social history, past surgical history and problem list.          Past Medical History:  Past Medical History:   Diagnosis Date    Abnormal ECG July 2017    PCP ARNP in-office EKG, irregular beat, not concerned    Anxiety     First occurrence about 20 years ago, during periods of unemployment    Arrhythmia July 2017    PCP ARNP in-office EKG, irregular beat, not concerned    Depression     Diabetes mellitus     TYPE 2    Family history of premature CAD 05/18/2018    History of medical problems 2007    Sleep Apnea - using CPAP    Hypercalcemia     Hyperlipidemia     Hypertension     Hypogonadism in male     Increased PTH level     Kidney  stones     Obesity     Sleep apnea     uses cpap       Social History:  Social History     Socioeconomic History    Marital status:    Tobacco Use    Smoking status: Former     Current packs/day: 0.00     Average packs/day: 0.5 packs/day for 21.5 years (10.7 ttl pk-yrs)     Types: Cigarettes     Start date: 1998     Quit date: 10/27/2019     Years since quittin.8     Passive exposure: Past    Smokeless tobacco: Never    Tobacco comments:     after 21 years, I quit on 10/27/19   Vaping Use    Vaping status: Never Used   Substance and Sexual Activity    Alcohol use: No     Comment: Caffeine use    Drug use: No    Sexual activity: Not Currently     Partners: Female     Birth control/protection: Abstinence       Family History:  Family History   Problem Relation Age of Onset    Diabetes Mother         Type 2    Arrhythmia Mother         got pacemaker 2 years ago to regulate or something like that - living, about age 80    Anxiety disorder Mother     Depression Mother     Heart attack Father 54        quadruple bypass, diabetic neuropathy, amputations,  age 54    Heart disease Father 54        quadruple bypass    Diabetes Father         Type 2    Sudden death Father     Depression Father     Heart failure Maternal Uncle     Diabetes Maternal Grandmother         Type 2    Heart attack Paternal Grandfather         death from heart attack when my father was a child    Diabetes Son         Type 1    Heart failure Maternal Uncle          age 64    Malig Hyperthermia Neg Hx        Past Surgical History:  Past Surgical History:   Procedure Laterality Date    BRANCHIAL CLEFT CYST EXCISION      COLONOSCOPY N/A 2017    Procedure: COLONOSCOPY INTO TERMINAL ILEUM WITH COLD BIOPSY POLYPECTOMIES;  Surgeon: Kelby Winter MD;  Location: [facility] ENDOSCOPY;  Service:     CYSTOSCOPY      2007    EXTRACORPOREAL SHOCK WAVE LITHOTRIPSY (ESWL) Left 09/15/2017    Procedure: EXTRACORPOREAL SHOCKWAVE LITHOTRIPSY  WITH CYSTOSCOPY AND STENT PLACEMENT AND LEFT UTEROSCOPY;  Surgeon: Otilio Mcgrath MD;  Location: [facility];  Service:     EXTRACORPOREAL SHOCK WAVE LITHOTRIPSY (ESWL) Left 10/11/2019    Procedure: EXTRACORPOREAL SHOCKWAVE LITHOTRIPSY LEFT;  Surgeon: Otilio Mcgrath MD;  Location: [facility];  Service: Urology    EXTRACORPOREAL SHOCK WAVE LITHOTRIPSY (ESWL) Left 03/17/2023    Procedure: EXTRACORPOREAL SHOCKWAVE LITHOTRIPSY;  Surgeon: Arun Potts MD;  Location: [facility] OR;  Service: Urology;  Laterality: Left;    EYE SURGERY      LASIK    HERNIA REPAIR      As a child    TONSILLECTOMY      URETEROSCOPY LASER LITHOTRIPSY WITH STENT INSERTION Left 01/24/2020    Procedure: CYSROSCOPY URETEROSCOPY LASER LITHOTRIPSY STONE BASKET EXTRACTION STENT WITH STRING;  Surgeon: Josse Garcia MD;  Location: [facility] OR;  Service: Urology       Problem List:  Patient Active Problem List   Diagnosis    Controlled type 2 diabetes mellitus without complication    Hypertension    Hyperlipidemia    Obstructive sleep apnea syndrome    Ureteral calculus, left    Family history of premature CAD    Renal calculus, left    Class 2 obesity due to excess calories without serious comorbidity with body mass index (BMI) of 36.0 to 36.9 in adult    PVC (premature ventricular contraction)    Vitamin D deficiency    Frequent PVCs    Anxiety    Skin lesion of left leg       Allergy:   Allergies   Allergen Reactions    Penicillins Other (See Comments)     AS CHILD    Sertraline Hcl Other (See Comments)     skaking        Current Medications:   Current Outpatient Medications   Medication Sig Dispense Refill    traZODone (DESYREL) 100 MG tablet Take 1-2 tablets by mouth Every Night. 180 tablet 0    traZODone (DESYREL) 50 MG tablet Take 0.5-1 tablets by mouth Daily As Needed (anxiety). 90 tablet 0    aspirin 81 MG EC tablet Take 1 tablet by mouth Daily.      empagliflozin (Jardiance) 25 MG tablet tablet Take 1 tablet by  mouth daily. 90 tablet 1    glimepiride (AMARYL) 4 MG tablet Take 1 tablet by mouth 2 (Two) Times a Day. 180 tablet 1    icosapent ethyl (VASCEPA) 1 g capsule capsule Take 2 g by mouth 2 (Two) Times a Day With Meals. 120 capsule 5    lisinopril-hydrochlorothiazide (PRINZIDE,ZESTORETIC) 20-12.5 MG per tablet Take 1 tablet by mouth daily. 90 tablet 1    melatonin 5 MG tablet tablet Take 1 tablet by mouth Every Night.      metFORMIN (GLUCOPHAGE) 1000 MG tablet Take 1 tablet by mouth 2 (Two) Times a Day With Meals. 180 tablet 1    metoprolol succinate XL (TOPROL-XL) 50 MG 24 hr tablet Take 1 tablet by mouth Daily. 90 tablet 0    Rybelsus 14 MG tablet Take 1 tablet by mouth daily. 90 tablet 1    simvastatin (ZOCOR) 40 MG tablet Take 1 tablet by mouth nightly. 30 tablet 5    SITagliptin (Januvia) 100 MG tablet Take 1 tablet by mouth daily. 90 tablet 1    vitamin D (ERGOCALCIFEROL) 1.25 MG (14397 UT) capsule capsule Take 1 capsule by mouth every 7 days. 12 capsule 1     No current facility-administered medications for this visit.       Review of Symptoms:    Review of Systems   Constitutional: Negative.    Psychiatric/Behavioral:  Positive for stress.          Physical Exam:   Due to the remote nature of this encounter (virtual encounter), vitals were unable to be obtained.  Height stated at 70 inches.  Weight stated at 254 pounds.      Physical Exam  Neurological:      Mental Status: He is alert.   Psychiatric:         Attention and Perception: Attention and perception normal. He does not perceive auditory or visual hallucinations.         Mood and Affect: Mood and affect normal.         Behavior: Behavior normal. Behavior is cooperative.         Thought Content: Thought content normal. Thought content is not paranoid or delusional. Thought content does not include homicidal or suicidal ideation. Thought content does not include homicidal or suicidal plan.         Cognition and Memory: Cognition and memory normal.          Judgment: Judgment normal.      Comments: Hyperverbal, circumstantial thought process at times           Mental Status Exam:   Hygiene:   good  Cooperation:  Cooperative  Eye Contact:  Good  Psychomotor Behavior:  Appropriate  Affect:  Appropriate  Mood: normal  Speech:   Hyperverbal  Thought Process:   Circumstantial at times  Thought Content:  Normal  Suicidal:  None  Homicidal:  None  Hallucinations:  None  Delusion:  None  Memory:  Intact  Orientation:  Person, Place, Time, and Situation  Reliability:  good  Insight:  Good  Judgement:  Good  Impulse Control:  Good      Patient Health Questionnaire-9 (PHQ-9) (Depression Screening Tool)  Little interest or pleasure in doing things? (P) 0-->not at all   Feeling down, depressed, or hopeless? (P) 0-->not at all   Trouble falling or staying asleep, or sleeping too much? (P) 1-->several days   Feeling tired or having little energy? (P) 0-->not at all   Poor appetite or overeating? (P) 0-->not at all   Feeling bad about yourself - or that you are a failure or have let yourself or your family down? (P) 0-->not at all   Trouble concentrating on things, such as reading the newspaper or watching television? (P) 0-->not at all   Moving or speaking so slowly that other people could have noticed? Or the opposite - being so fidgety or restless that you have been moving around a lot more than usual? (P) 0-->not at all   Thoughts that you would be better off dead, or of hurting yourself in some way? (P) 0-->not at all   PHQ-9 Total Score (P) 1   If you checked off any problems, how difficult have these problems made it for you to do your work, take care of things at home, or get along with other people? (P) not difficult at all     PHQ-9 Total Score: (P) 1      Generalized Anxiety Disorder 7-Item (ADITYA-7) Screening Tool  Feeling nervous, anxious or on edge: (P) Several days  Not being able to stop or control worrying: (P) Several days  Worrying too much about different things: (P)  Several days  Trouble Relaxing: (P) Not at all  Being so restless that it is hard to sit still: (P) Not at all  Feeling afraid as if something awful might happen: (P) Several days  Becoming easily annoyed or irritable: (P) Not at all  ADITYA 7 Total Score: (P) 4  If you checked any problems, how difficult have these problems made it for you to do your work, take care of things at home, or get along with other people: (P) Not difficult at all    [facility] request number [id number] reviewed by this APRN at today's encounter.    Previous Provider notes and available records reviewed by this APRN at today's encounter.       Lab Results:   No visits with results within 1 Month(s) from this visit.   Latest known visit with results is:   Admission on 03/17/2023, Discharged on 03/17/2023   Component Date Value Ref Range Status    Glucose 03/17/2023 257 (H)  70 - 130 mg/dL Final    Meter: [id number] : [id number] Agustín DELGADO    Glucose 03/17/2023 197 (H)  70 - 130 mg/dL Final    Meter: [id number] : [id number] Mora Sanchez RN         Assessment & Plan   Problems Addressed this Visit    None  Visit Diagnoses       ADITYA (generalized anxiety disorder)  (Chronic)   -  Primary    Relevant Medications    traZODone (DESYREL) 50 MG tablet    traZODone (DESYREL) 100 MG tablet    Other insomnia  (Chronic)       Relevant Medications    traZODone (DESYREL) 100 MG tablet          Diagnoses         Codes Comments    ADITYA (generalized anxiety disorder)    -  Primary ICD-10-CM: F41.1  ICD-9-CM: 300.02     Other insomnia     ICD-10-CM: G47.09  ICD-9-CM: 780.52             Visit Diagnoses:    ICD-10-CM ICD-9-CM   1. ADITYA (generalized anxiety disorder)  F41.1 300.02   2. Other insomnia  G47.09 780.52          GOALS:  Short Term Goals: Patient will be compliant with medication, and patient will have no significant medication related side effects.  Patient will be engaged in psychotherapy as indicated. Patient will report subjective  improvement of symptoms.  Long term goals: To stabilize mood and treat/improve subjective symptoms, the patient will stay out of the hospital, the patient will be at an optimal level of functioning, and the patient will take all medications as prescribed.  The patient verbalized understanding and agreement with goals that were mutually set.      TREATMENT PLAN: Continue supportive psychotherapy efforts and medications as indicated.  Medication and treatment options, both pharmacological and non-pharmacological treatment options, discussed during today's visit, including any off label use of medication. Patient acknowledged and verbally consented with current treatment plan and was educated on the importance of compliance with treatment and follow-up appointments.      -Continue Trazodone 25-50 mg PO Daily PRN for anxiety   -Continue Trazodone 100-200 mg PO QHS for sleep   -Pt requests 6 month follow up      MEDICATION ISSUES:  Discussed treatment plan and medication options of prescribed medication as well as the risks, benefits, any black box warnings, and side effects including potential falls, possible impaired driving, and metabolic adversities among others, including any off label use of medication. Patient is agreeable to call the office with any worsening of symptoms or onset of side effects, or if any concerns or questions arise.  The contact information for the office is made available to the patient. Patient is agreeable to call 911 or go to the nearest ER should they begin having any SI/HI, or if any urgent concerns arise.       VERBAL INFORMED CONSENT FOR MEDICATION:  The patient was educated that their proposed/prescribed psychotropic medication(s) has potential risks, side effects, adverse effects, and black box warnings; and these have been discussed with the patient.  The patient has been informed that their treatment and medication dosage is to be individualized, and may even be above or below the  recommended range/dosage due to patient individualization and response, but medication is prescribed using a shared decision making approach, and no medication or dosage will be prescribed without the patient's verbal consent.  The reason for the use of the medication including any off label use and alternative modes of treatment other than or in addition to medication has been considered and discussed, the probable consequences of not receiving the proposed treatment have been discussed, and any treatment side effects, black box warnings, and cautions associated with treatment have been discussed with the patient.  The patient is allowed ample time to openly discuss and ask questions regarding the proposed medication(s) and treatment plan and the patient verbalizes understanding the reasons for the use of the medication, its potential risks and benefits, other alternative treatment(s), and the probable consequences that may occur if the proposed medication is not given.  The patient has been given ample time to ask questions and study the information and find the information to be specific, accurate, and complete.  The patient gives verbal consent for the medication(s) proposed/prescribed, they verbalized understanding that they can refuse and withdraw consent at any time with the assistance of this APRN, and the patient has verbally confirmed that they are aware, and are willing, to take the prescribed medication and follow the treatment plan with the known possible risks, side effect, black box warnings, and any potential medication interactions, and the patient reports they will be worse off without this medication and treatment plan.  The patient is advised to contact this APRN/this office if any questions or concerns arise at any time (at [phone]), or call 911/go to the closest emergency department if needed or outside of office hours.      SUICIDE RISK ASSESSMENT AND SAFETY PLAN: Unalterable demographics  and a history of mental health intervention indicate this patient is in a high risk category compared to the general population. At present, the patient denies active SI/HI, intentions, or plans at this time and agrees to seek immediate care should such thoughts develop. The patient verbalizes understanding of how to access emergency care if needed and agrees to do so. Consideration of suicide risk and protective factors such as history, current presentation, individual strengths and weaknesses, psychosocial and environmental stressors and variables, psychiatric illness and symptoms, medical conditions and pain, took place in this interview. Based on those considerations, the patient is determined: within individual baseline and presenting no imminent risk for suicide or homicide. Other recommendations: The patient does not meet the criteria for inpatient admission and is not a safety risk to self or others at today's visit. Inpatient treatment offers no significant advantages over outpatient treatment for this patient at today's visit.  The patient was given ample time for questions and fully participated in treatment planning.  The patient was encouraged to call the clinic with any questions or concerns.  The patient was informed of access to emergency care. If patient were to develop any significant symptomatology, suicidal ideation, homicidal ideation, any concerns, or feel unsafe at any time they are to call the clinic and if unable to get immediate assistance should immediately call 911 or go to the nearest emergency room.  Patient contracted verbally for the following: If you are experiencing an emotional crisis or have thoughts of harming yourself or others, please go to your nearest local emergency room or call 911. Will continue to re-assess medication response and side effects frequently to establish efficacy and ensure safety. Risks, any black box warnings, side effects, off label usage, and benefits of  medication and treatment discussed with patient, along with potential adverse side effects of current and/or newly prescribed medication, alternative treatment options, and OTC medications.  Patient verbalized understanding of potential risks, any off label use of medication, any black box warnings, and any side effects in their own words. The patient verbalized understanding and agreed to comply with the safety plan discussed in their own words.  Patient given the number to the office. Number also discussed of the 24- hour suicide hotline.       MEDS ORDERED DURING VISIT:  New Medications Ordered This Visit   Medications    traZODone (DESYREL) 50 MG tablet     Sig: Take 0.5-1 tablets by mouth Daily As Needed (anxiety).     Dispense:  90 tablet     Refill:  0    traZODone (DESYREL) 100 MG tablet     Sig: Take 1-2 tablets by mouth Every Night.     Dispense:  180 tablet     Refill:  0       Return in about 6 months (around 2/27/2025), or if symptoms worsen or fail to improve, for Recheck.         Progress toward goal: Not at goal    Functional Status: Mild impairment     Prognosis: Good with Ongoing Treatment         This document has been electronically signed by ERNESTINE Rutherford  August 27, 2024 17:51 EDT    Some of the data in this electronic note has been brought forward from a previous encounter, any necessary changes have been made, it has been reviewed by this APRN, and it is accurate.    Please note that portions of this note were completed with a voice recognition program.

## 2024-10-08 DIAGNOSIS — Z82.49 FAMILY HISTORY OF PREMATURE CAD: Chronic | ICD-10-CM

## 2024-10-08 DIAGNOSIS — I10 PRIMARY HYPERTENSION: Chronic | ICD-10-CM

## 2024-10-08 DIAGNOSIS — I49.3 FREQUENT PVCS: Chronic | ICD-10-CM

## 2024-10-08 DIAGNOSIS — E78.2 MIXED HYPERLIPIDEMIA: Chronic | ICD-10-CM

## 2024-10-08 RX ORDER — METOPROLOL SUCCINATE 50 MG/1
50 TABLET, EXTENDED RELEASE ORAL DAILY
Qty: 90 TABLET | Refills: 0 | OUTPATIENT
Start: 2024-10-08

## 2024-10-09 ENCOUNTER — TELEPHONE (OUTPATIENT)
Dept: CARDIOLOGY | Facility: CLINIC | Age: 59
End: 2024-10-09
Payer: COMMERCIAL

## 2024-10-09 DIAGNOSIS — E78.2 MIXED HYPERLIPIDEMIA: Chronic | ICD-10-CM

## 2024-10-09 DIAGNOSIS — G47.09 OTHER INSOMNIA: Chronic | ICD-10-CM

## 2024-10-09 DIAGNOSIS — I49.3 FREQUENT PVCS: Chronic | ICD-10-CM

## 2024-10-09 DIAGNOSIS — I10 PRIMARY HYPERTENSION: Chronic | ICD-10-CM

## 2024-10-09 DIAGNOSIS — F41.1 GAD (GENERALIZED ANXIETY DISORDER): Chronic | ICD-10-CM

## 2024-10-09 DIAGNOSIS — Z82.49 FAMILY HISTORY OF PREMATURE CAD: Chronic | ICD-10-CM

## 2024-10-09 RX ORDER — TRAZODONE HYDROCHLORIDE 100 MG/1
100-200 TABLET ORAL NIGHTLY
Qty: 180 TABLET | Refills: 0 | Status: SHIPPED | OUTPATIENT
Start: 2024-10-09

## 2024-10-09 RX ORDER — TRAZODONE HYDROCHLORIDE 50 MG/1
25-50 TABLET, FILM COATED ORAL DAILY PRN
Qty: 90 TABLET | Refills: 0 | Status: SHIPPED | OUTPATIENT
Start: 2024-10-09

## 2024-10-09 NOTE — TELEPHONE ENCOUNTER
"    Caller: Angelito Hart \"ANA\"    Relationship: Self    Best call back number: [phone]    Which medication are you concerned about: METOPROLOL SUCCINATE XL 50 MG     Who prescribed you this medication: DR. FLANAGAN    When did you start taking this medication: AROUND 2 YEARS    What are your concerns: PATIENT STATED THAT THE PRESCRIPTION WAS DENIED BY DR. FLANAGAN AND HE IS WHO PRESCRIBES THE MEDICATION.PATIENT WOULD LIKE A CALL BACK. PATIENT STATED THAT PATIENT NEEDS A CALL BACK AFTER 4 PM.    How long have you had these concerns: 10.09.24          "

## 2024-10-10 RX ORDER — METOPROLOL SUCCINATE 50 MG/1
50 TABLET, EXTENDED RELEASE ORAL DAILY
Qty: 90 TABLET | Refills: 0 | Status: SHIPPED | OUTPATIENT
Start: 2024-10-10

## 2024-10-21 ENCOUNTER — TELEPHONE (OUTPATIENT)
Dept: PSYCHIATRY | Facility: CLINIC | Age: 59
End: 2024-10-21
Payer: COMMERCIAL

## 2024-10-21 NOTE — TELEPHONE ENCOUNTER
Pt states that he would like to wait on the increase of the Trazodone.  Pt states he will try to hold off for now.

## 2024-10-21 NOTE — TELEPHONE ENCOUNTER
I rather not make medication changes without seeing patients, please schedule pt an appointment. Thank you.

## 2024-10-21 NOTE — TELEPHONE ENCOUNTER
Pt called request increase on the Trazodone 50 mg two pills per day due to dealing with anxiety with his wife losing insurance soon.Please advise

## 2025-04-29 DIAGNOSIS — G47.09 OTHER INSOMNIA: Chronic | ICD-10-CM

## 2025-04-29 DIAGNOSIS — F41.1 GAD (GENERALIZED ANXIETY DISORDER): Chronic | ICD-10-CM

## 2025-04-29 RX ORDER — TRAZODONE HYDROCHLORIDE 50 MG/1
25-50 TABLET ORAL DAILY PRN
Qty: 30 TABLET | Refills: 0 | Status: SHIPPED | OUTPATIENT
Start: 2025-04-29

## 2025-04-29 RX ORDER — TRAZODONE HYDROCHLORIDE 100 MG/1
100-200 TABLET ORAL NIGHTLY
Qty: 60 TABLET | Refills: 0 | Status: SHIPPED | OUTPATIENT
Start: 2025-04-29

## 2025-04-29 NOTE — TELEPHONE ENCOUNTER
Pt states he had tried to not have to stay on this medication.  Pt states that he had thought he would try it go without needing to take the Trazodone.  Pt states he has changed his mind and has rescheduled with the provider for 05/27/2025 a date and time that was good for the pt.  Pt was offered sooner appts but pt states it is really hard for him to get off work so he would like to keep a 4:30 pm time for the appt.  Pt would like to see if the provider could send in a refill enough to due him until his scheduled appt.    Please use this pharmacy  [facility] outpatient [facility]   Address: [address] [place] [id number]  Phone: [phone]

## 2025-05-27 ENCOUNTER — TELEMEDICINE (OUTPATIENT)
Dept: PSYCHIATRY | Facility: CLINIC | Age: 60
End: 2025-05-27
Payer: COMMERCIAL

## 2025-05-27 DIAGNOSIS — G47.09 OTHER INSOMNIA: Chronic | ICD-10-CM

## 2025-05-27 DIAGNOSIS — F41.1 GAD (GENERALIZED ANXIETY DISORDER): Primary | Chronic | ICD-10-CM

## 2025-05-27 RX ORDER — TRAZODONE HYDROCHLORIDE 100 MG/1
100-200 TABLET ORAL NIGHTLY
Qty: 180 TABLET | Refills: 1 | Status: SHIPPED | OUTPATIENT
Start: 2025-05-27

## 2025-05-27 RX ORDER — ICOSAPENT ETHYL 1 G/1
2 CAPSULE ORAL
COMMUNITY
Start: 2025-04-03 | End: 2025-05-27 | Stop reason: SDUPTHER

## 2025-05-27 RX ORDER — TRAZODONE HYDROCHLORIDE 50 MG/1
25-50 TABLET ORAL DAILY PRN
Qty: 90 TABLET | Refills: 1 | Status: SHIPPED | OUTPATIENT
Start: 2025-05-27

## 2025-05-27 NOTE — PROGRESS NOTES
This provider is completing this appointment through [facility] (through [facility]), [address] [place] [address], [facility], [address] using a secure [employer] Video Visit through [employer]. Patient is being seen remotely via telehealth at their workplace in [place], and stated they are in a secure environment for this session. The patient's condition being diagnosed/treated is appropriate for telemedicine. If at any point the patient is no longer appropriate for telemedicine, the patient will be referred to in-person services. The provider identified herself as well as her credentials.   The patient, and/or patients guardian, consent to be seen remotely, and when consent is given they understand that the consent allows for patient identifiable information to be sent to a third party as needed.   They may refuse to be seen remotely at any time. The electronic data is encrypted and password protected, and the patient and/or guardian has been advised of the potential risks to privacy not withstanding such measures.    You have chosen to receive care through a telehealth visit.  Do you consent to use a video/audio connection for your medical care today? Yes    Patient identifiers utilized: Name and date of birth.        Subjective   Angelito Hart is a 59 y.o. male who presents today for follow up    Chief Complaint: Anxiety, sleep disruption    Accompanied by: Pt was alone for duration of appointment     History of Present Illness:   Pt was last seen by this APRN on 8/27/24 for an initial evaluation.   Earlier this year, he contemplated tapering off the Trazodone. However, he has decided against it for now. Currently, he is taking anywhere between 0-50 mg of Trazodone during the day. Typically, he averages 25 mg PO Daily. He continues to take 100-200 mg PO QHS for sleep. Typically, he averages 100 mg and uses his CPAP. He averages 5-6 hours of sleep at night. Pt continues to find benefit  "from the Trazodone for anxiety and sleep. 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 They had a few job openings in February for a permanent position, but by the time he had an interview the position was unfortunately filled. [facility] is anticipating an opening this summer. Pt reports his wife's job at [employer] was outsourced and she obtained a job with [employer]. Pt states he and his son are on his wife's health insurance plan. Mood has been stable, other than intermittent anxiety. The patient reports compliance with current medication regimen. The patient denies any current side effects from their current medication regimen. The patient would like to not adjust or change their medications at this visit. The patient denies any suicidal or homicidal ideations, plans, or intent at today's encounter and is convincing. The patient denies any auditory hallucinations or visual hallucinations. The patient does not endorse any significant symptoms consistent with chavez or psychosis at today's encounter.     *If the patient has any concerns or needs assistance, they may call the [facility] at [phone]*      Prior Psychiatric Medications:  Zoloft - head and neck shake  Paxil  Effexor - emotional zombie  Adderall   Ritalin  Xanax    *In regards to the Adderall, Ritalin, and Xanax, pt states he was prescribed Adderall/Ritalin and maxed out dosing combined with Xanax. Pt reports the psychiatrist prescribed him the combo, which people refer to as \"speed balling. Pt was taking was taking night classes at that time and working. Pt reports he does not have ADHD.*      The following portions of the patient's history were reviewed and updated as appropriate: allergies, current medications, past family history, past medical history, past social history, past surgical history and problem list.          Past Medical History:  Past Medical History:   Diagnosis Date    " Abnormal ECG 2017    PCP ARNP in-office EKG, irregular beat, not concerned    Anxiety     First occurrence about 20 years ago, during periods of unemployment    Arrhythmia 2017    PCP ARNP in-office EKG, irregular beat, not concerned    Depression     Diabetes mellitus     TYPE 2    Family history of premature CAD 2018    History of medical problems 2007    Sleep Apnea - using CPAP    Hypercalcemia     Hyperlipidemia     Hypertension     Hypogonadism in male     Increased PTH level     Kidney stones     Obesity     Sleep apnea     uses cpap       Social History:  Social History     Socioeconomic History    Marital status:    Tobacco Use    Smoking status: Former     Current packs/day: 0.00     Average packs/day: 0.5 packs/day for 21.5 years (10.7 ttl pk-yrs)     Types: Cigarettes     Start date: 1998     Quit date: 10/27/2019     Years since quittin.5     Passive exposure: Past    Smokeless tobacco: Never    Tobacco comments:     after 21 years, I quit on 10/27/19   Vaping Use    Vaping status: Never Used   Substance and Sexual Activity    Alcohol use: No     Comment: Caffeine use    Drug use: No    Sexual activity: Not Currently     Partners: Female     Birth control/protection: Abstinence       Family History:  Family History   Problem Relation Age of Onset    Diabetes Mother         Type 2    Arrhythmia Mother         got pacemaker 2 years ago to regulate or something like that - living, about age 80    Anxiety disorder Mother     Depression Mother     Heart attack Father 54        quadruple bypass, diabetic neuropathy, amputations,  age 54    Heart disease Father 54        quadruple bypass    Diabetes Father         Type 2    Sudden death Father     Depression Father     Heart failure Maternal Uncle     Diabetes Maternal Grandmother         Type 2    Heart attack Paternal Grandfather         death from heart attack when my father was a child    Diabetes Son         Type 1     Heart failure Maternal Uncle          age 64    Malig Hyperthermia Neg Hx        Past Surgical History:  Past Surgical History:   Procedure Laterality Date    BRANCHIAL CLEFT CYST EXCISION      COLONOSCOPY N/A 2017    Procedure: COLONOSCOPY INTO TERMINAL ILEUM WITH COLD BIOPSY POLYPECTOMIES;  Surgeon: Kelby Winter MD;  Location: [facility] ENDOSCOPY;  Service:     CYSTOSCOPY      2007    EXTRACORPOREAL SHOCK WAVE LITHOTRIPSY (ESWL) Left 09/15/2017    Procedure: EXTRACORPOREAL SHOCKWAVE LITHOTRIPSY WITH CYSTOSCOPY AND STENT PLACEMENT AND LEFT UTEROSCOPY;  Surgeon: Otilio Mcgrath MD;  [facility];  Service:     EXTRACORPOREAL SHOCK WAVE LITHOTRIPSY (ESWL) Left 10/11/2019    Procedure: EXTRACORPOREAL SHOCKWAVE LITHOTRIPSY LEFT;  Surgeon: Otilio Mcgrath MD;  [facility];  Service: Urology    EXTRACORPOREAL SHOCK WAVE LITHOTRIPSY (ESWL) Left 2023    Procedure: EXTRACORPOREAL SHOCKWAVE LITHOTRIPSY;  Surgeon: Arun Potts MD;  Location: [facility] OR;  Service: Urology;  Laterality: Left;    EYE SURGERY      LASIK    HERNIA REPAIR      As a child    TONSILLECTOMY      URETEROSCOPY LASER LITHOTRIPSY WITH STENT INSERTION Left 2020    Procedure: CYSROSCOPY URETEROSCOPY LASER LITHOTRIPSY STONE BASKET EXTRACTION STENT WITH STRING;  Surgeon: Josse Garcia MD;  Location: [facility] OR;  Service: Urology       Problem List:  Patient Active Problem List   Diagnosis    Controlled type 2 diabetes mellitus without complication    Hypertension    Hyperlipidemia    Obstructive sleep apnea syndrome    Ureteral calculus, left    Family history of premature CAD    Renal calculus, left    Class 2 obesity due to excess calories without serious comorbidity with body mass index (BMI) of 36.0 to 36.9 in adult    PVC (premature ventricular contraction)    Vitamin D deficiency    Frequent PVCs    Anxiety    Skin lesion of left leg       Allergy:   Allergies   Allergen Reactions     Penicillins Other (See Comments)     AS CHILD    Sertraline Hcl Other (See Comments)     skaking        Current Medications:   Current Outpatient Medications   Medication Sig Dispense Refill    traZODone (DESYREL) 100 MG tablet Take 1-2 tablets by mouth Every Night. 180 tablet 1    traZODone (DESYREL) 50 MG tablet Take 0.5-1 tablets by mouth Daily As Needed (anxiety). 90 tablet 1    aspirin 81 MG EC tablet Take 1 tablet by mouth Daily.      empagliflozin (Jardiance) 25 MG tablet tablet Take 1 tablet by mouth daily. 90 tablet 1    glimepiride (AMARYL) 4 MG tablet Take 1 tablet by mouth 2 (Two) Times a Day. 180 tablet 1    icosapent ethyl (Vascepa) 1 g capsule capsule Take 2 g by mouth 2 (Two) Times a Day With Meals. 120 capsule 5    lisinopril-hydrochlorothiazide (PRINZIDE,ZESTORETIC) 20-12.5 MG per tablet Take 1 tablet by mouth daily. 90 tablet 1    metFORMIN (GLUCOPHAGE) 1000 MG tablet Take 1 tablet by mouth 2 (Two) Times a Day With Meals. 180 tablet 1    metoprolol succinate XL (TOPROL-XL) 50 MG 24 hr tablet Take 1 tablet by mouth daily. 30 tablet 2    Rybelsus 14 MG tablet Take 1 tablet by mouth daily. 90 tablet 1    simvastatin (ZOCOR) 40 MG tablet Take 1 tablet by mouth nightly. 30 tablet 5    SITagliptin (Januvia) 100 MG tablet Take 1 tablet by mouth daily. 90 tablet 1    vitamin D (ERGOCALCIFEROL) 1.25 MG (78797 UT) capsule capsule Take 1 capsule by mouth every 7 days. 12 capsule 1     No current facility-administered medications for this visit.       Review of Symptoms:    Review of Systems   Constitutional: Negative.    Psychiatric/Behavioral:  Positive for sleep disturbance.          Physical Exam:   Due to the remote nature of this encounter (virtual encounter), vitals were unable to be obtained.  Height stated at 70 inches.  Weight stated at 254 pounds.      Physical Exam  Neurological:      Mental Status: He is alert.   Psychiatric:         Attention and Perception: Attention and perception normal.          Mood and Affect: Mood and affect normal.         Behavior: Behavior normal. Behavior is cooperative.         Thought Content: Thought content normal. Thought content is not paranoid or delusional. Thought content does not include homicidal or suicidal ideation. Thought content does not include homicidal or suicidal plan.         Cognition and Memory: Cognition and memory normal.         Judgment: Judgment normal.      Comments: Hyperverbal, circumstantial thought process at times           Mental Status Exam:   Hygiene:   good  Cooperation:  Cooperative  Eye Contact:  Good  Psychomotor Behavior:  Appropriate  Affect:  Appropriate  Mood: normal  Speech:   Hyperverbal  Thought Process:  Goal directed and circumstantial at times  Thought Content:  Normal  Suicidal:  None  Homicidal:  None  Hallucinations:  None  Delusion:  None  Memory:  Intact  Orientation:  Person, Place, Time, and Situation  Reliability:  good  Insight:  Good  Judgement:  Good  Impulse Control:  Good      Patient Health Questionnaire-9 (PHQ-9) (Depression Screening Tool)  Little interest or pleasure in doing things? (Patient-Rptd) Not at all   Feeling down, depressed, or hopeless? (Patient-Rptd) Not at all   PHQ-2 Total Score (Patient-Rptd) 0   Trouble falling or staying asleep, or sleeping too much? (Patient-Rptd) Almost all   Feeling tired or having little energy? (Patient-Rptd) Not at all   Poor appetite or overeating? (Patient-Rptd) Not at all   Feeling bad about yourself - or that you are a failure or have let yourself or your family down? (Patient-Rptd) Not at all   Trouble concentrating on things, such as reading the newspaper or watching television? (Patient-Rptd) Not at all   Moving or speaking so slowly that other people could have noticed? Or the opposite - being so fidgety or restless that you have been moving around a lot more than usual? (Patient-Rptd) Not at all   Thoughts that you would be better off dead, or of hurting  yourself in some way? (Patient-Rptd) Not at all   PHQ-9 Total Score (Patient-Rptd) 3   If you checked off any problems, how difficult have these problems made it for you to do your work, take care of things at home, or get along with other people? (Patient-Rptd) Not difficult at all         PHQ-9 Total Score: (Patient-Rptd) 3       Generalized Anxiety Disorder 7-Item (ADITYA-7) Screening Tool  Feeling nervous, anxious or on edge: (Patient-Rptd) Several days  Not being able to stop or control worrying: (Patient-Rptd) More than half the days  Worrying too much about different things: (Patient-Rptd) Several days  Trouble Relaxing: (Patient-Rptd) Not at all  Being so restless that it is hard to sit still: (Patient-Rptd) Not at all  Feeling afraid as if something awful might happen: (Patient-Rptd) More than half the days  Becoming easily annoyed or irritable: (Patient-Rptd) Several days  ADITYA 7 Total Score: (Patient-Rptd) 7  If you checked any problems, how difficult have these problems made it for you to do your work, take care of things at home, or get along with other people: (Patient-Rptd) Not difficult at all    Previous Provider notes and available records reviewed by this APRN at today's encounter.       Lab Results:   No visits with results within 1 Month(s) from this visit.   Latest known visit with results is:   Admission on 03/17/2023, Discharged on 03/17/2023   Component Date Value Ref Range Status    Glucose 03/17/2023 257 (H)  70 - 130 mg/dL Final    Meter: [id number] : [id number] Agustín DELGADO    Glucose 03/17/2023 197 (H)  70 - 130 mg/dL Final    Meter: [id number] : [id number] Mora Sanchez RN         Assessment & Plan   Problems Addressed this Visit    None  Visit Diagnoses         ADITYA (generalized anxiety disorder)  (Chronic)   -  Primary    Relevant Medications    traZODone (DESYREL) 50 MG tablet    traZODone (DESYREL) 100 MG tablet      Other insomnia  (Chronic)       Relevant Medications     traZODone (DESYREL) 100 MG tablet          Diagnoses         Codes Comments      ADITYA (generalized anxiety disorder)    -  Primary ICD-10-CM: F41.1  ICD-9-CM: 300.02       Other insomnia     ICD-10-CM: G47.09  ICD-9-CM: 780.52             Visit Diagnoses:    ICD-10-CM ICD-9-CM   1. ADITYA (generalized anxiety disorder)  F41.1 300.02   2. Other insomnia  G47.09 780.52         GOALS:  Short Term Goals: Patient will be compliant with medication, and patient will have no significant medication related side effects.  Patient will be engaged in psychotherapy as indicated. Patient will report subjective improvement of symptoms.  Long term goals: To stabilize mood and treat/improve subjective symptoms, the patient will stay out of the hospital, the patient will be at an optimal level of functioning, and the patient will take all medications as prescribed.  The patient verbalized understanding and agreement with goals that were mutually set.      TREATMENT PLAN: Continue supportive psychotherapy efforts and medications as indicated.  Medication and treatment options, both pharmacological and non-pharmacological treatment options, discussed during today's visit, including any off label use of medication. Patient acknowledged and verbally consented with current treatment plan and was educated on the importance of compliance with treatment and follow-up appointments.      -Continue Trazodone 25-50 mg PO Daily PRN for anxiety   -Continue Trazodone 100-200 mg PO QHS for sleep   -Pt requests 6 month follow up due to stability.       MEDICATION ISSUES:  Discussed treatment plan and medication options of prescribed medication as well as the risks, benefits, any black box warnings, and side effects including potential falls, possible impaired driving, and metabolic adversities among others, including any off label use of medication. Patient is agreeable to call the office with any worsening of symptoms or onset of side effects, or if any  concerns or questions arise. The contact information for the office is made available to the patient. Patient is agreeable to call 911 or go to the nearest ER should they begin having any SI/HI, or if any urgent concerns arise.       VERBAL INFORMED CONSENT FOR MEDICATION:  The patient was educated that their proposed/prescribed psychotropic medication(s) has potential risks, side effects, adverse effects, and black box warnings; and these have been discussed with the patient.  The patient has been informed that their treatment and medication dosage is to be individualized, and may even be above or below the recommended range/dosage due to patient individualization and response, but medication is prescribed using a shared decision making approach, and no medication or dosage will be prescribed without the patient's verbal consent.  The reason for the use of the medication including any off label use and alternative modes of treatment other than or in addition to medication has been considered and discussed, the probable consequences of not receiving the proposed treatment have been discussed, and any treatment side effects, black box warnings, and cautions associated with treatment have been discussed with the patient.  The patient is allowed ample time to openly discuss and ask questions regarding the proposed medication(s) and treatment plan and the patient verbalizes understanding the reasons for the use of the medication, its potential risks and benefits, other alternative treatment(s), and the probable consequences that may occur if the proposed medication is not given.  The patient has been given ample time to ask questions and study the information and find the information to be specific, accurate, and complete.  The patient gives verbal consent for the medication(s) proposed/prescribed, they verbalized understanding that they can refuse and withdraw consent at any time with the assistance of this APRN, and  the patient has verbally confirmed that they are aware, and are willing, to take the prescribed medication and follow the treatment plan with the known possible risks, side effect, black box warnings, and any potential medication interactions, and the patient reports they will be worse off without this medication and treatment plan.  The patient is advised to contact this APRN/this office if any questions or concerns arise at any time (at [phone]), or call 911/go to the closest emergency department if needed or outside of office hours.      SUICIDE RISK ASSESSMENT AND SAFETY PLAN: Unalterable demographics and a history of mental health intervention indicate this patient is in a high risk category compared to the general population. At present, the patient denies active SI/HI, intentions, or plans at this time and agrees to seek immediate care should such thoughts develop. The patient verbalizes understanding of how to access emergency care if needed and agrees to do so. Consideration of suicide risk and protective factors such as history, current presentation, individual strengths and weaknesses, psychosocial and environmental stressors and variables, psychiatric illness and symptoms, medical conditions and pain, took place in this interview. Based on those considerations, the patient is determined: within individual baseline and presenting no imminent risk for suicide or homicide. Other recommendations: The patient does not meet the criteria for inpatient admission and is not a safety risk to self or others at today's visit. Inpatient treatment offers no significant advantages over outpatient treatment for this patient at today's visit.  The patient was given ample time for questions and fully participated in treatment planning.  The patient was encouraged to call the clinic with any questions or concerns.  The patient was informed of access to emergency care. If patient were to develop any significant  symptomatology, suicidal ideation, homicidal ideation, any concerns, or feel unsafe at any time they are to call the clinic and if unable to get immediate assistance should immediately call 911 or go to the nearest emergency room.  Patient contracted verbally for the following: If you are experiencing an emotional crisis or have thoughts of harming yourself or others, please go to your nearest local emergency room or call 911. Will continue to re-assess medication response and side effects frequently to establish efficacy and ensure safety. Risks, any black box warnings, side effects, off label usage, and benefits of medication and treatment discussed with patient, along with potential adverse side effects of current and/or newly prescribed medication, alternative treatment options, and OTC medications.  Patient verbalized understanding of potential risks, any off label use of medication, any black box warnings, and any side effects in their own words. The patient verbalized understanding and agreed to comply with the safety plan discussed in their own words.  Patient given the number to the office. Number also discussed of the 24- hour suicide hotline.       MEDS ORDERED DURING VISIT:  New Medications Ordered This Visit   Medications    traZODone (DESYREL) 50 MG tablet     Sig: Take 0.5-1 tablets by mouth Daily As Needed (anxiety).     Dispense:  90 tablet     Refill:  1    traZODone (DESYREL) 100 MG tablet     Sig: Take 1-2 tablets by mouth Every Night.     Dispense:  180 tablet     Refill:  1       Return in about 6 months (around 11/27/2025), or if symptoms worsen or fail to improve, for Recheck.     Progress toward goal: Not at goal    Functional Status: Mild impairment     Prognosis: Good with Ongoing Treatment         This document has been electronically signed by ERNESTINE Rutherford  May 27, 2025 17:07 EDT    Some of the data in this electronic note has been brought forward from a previous encounter, any  necessary changes have been made, it has been reviewed by this APRN, and it is accurate.    Please note that portions of this note were completed with a voice recognition program.

## (undated) DEVICE — 3M™ STERI-STRIP™ REINFORCED ADHESIVE SKIN CLOSURES, R1546, 1/4 IN X 4 IN (6 MM X 100 MM), 10 STRIPS/ENVELOPE: Brand: 3M™ STERI-STRIP™

## (undated) DEVICE — TIDISHIELD UROLOGY DRAIN BAGS FROSTY VINYL STERILE FITS SIEMENS UROSKOP ACCESS 20 PER CASE: Brand: TIDISHIELD

## (undated) DEVICE — CATH URETRL FLXITP POLLACK STD 5F 70CM

## (undated) DEVICE — CANN NASL CO2 TRULINK W/O2 A/

## (undated) DEVICE — GLV SURG BIOGEL LTX PF 7 1/2

## (undated) DEVICE — PK URETSCP 40

## (undated) DEVICE — GLV SURG OR CLASSIC PWDR LTX 8 STRL

## (undated) DEVICE — THE TORRENT IRRIGATION SCOPE CONNECTOR IS USED WITH THE TORRENT IRRIGATION TUBING TO PROVIDE IRRIGATION FLUIDS SUCH AS STERILE WATER DURING GASTROINTESTINAL ENDOSCOPIC PROCEDURES WHEN USED IN CONJUNCTION WITH AN IRRIGATION PUMP (OR ELECTROSURGICAL UNIT).: Brand: TORRENT

## (undated) DEVICE — PREP SOL POVIDONE/IODINE BT 4OZ

## (undated) DEVICE — SYS IRR PUMP SGL ACTN VAC SYR 10CC

## (undated) DEVICE — LOU CYSTO: Brand: MEDLINE INDUSTRIES, INC.

## (undated) DEVICE — TUBING, SUCTION, 1/4" X 10', STRAIGHT: Brand: MEDLINE

## (undated) DEVICE — GOWN,NON-REINFORCED,SIRUS,SET IN SLV,XL: Brand: MEDLINE

## (undated) DEVICE — GLV SURG TRIUMPH CLASSIC PF LTX 8 STRL

## (undated) DEVICE — EXTRCT STN NCIRCLE TIPLSS 2.2F1X115CM

## (undated) DEVICE — SINGLE-USE BIOPSY FORCEPS: Brand: RADIAL JAW 4

## (undated) DEVICE — FIBR LASR HOLMIUM ACCUMAX 200 1PT USE

## (undated) DEVICE — Device: Brand: DEFENDO AIR/WATER/SUCTION AND BIOPSY VALVE

## (undated) DEVICE — NITINOL STONE RETRIEVAL BASKET: Brand: ZERO TIP

## (undated) DEVICE — SHEATH URETRL ACC NAVIGATOR 13/15F 28CM 5PK

## (undated) DEVICE — PRT BIOP SEALS

## (undated) DEVICE — ADHS LIQ MASTISOL 2/3ML